# Patient Record
Sex: MALE | Race: WHITE | Employment: FULL TIME | URBAN - METROPOLITAN AREA
[De-identification: names, ages, dates, MRNs, and addresses within clinical notes are randomized per-mention and may not be internally consistent; named-entity substitution may affect disease eponyms.]

---

## 2018-03-15 RX ORDER — ATORVASTATIN CALCIUM 20 MG/1
20 TABLET, FILM COATED ORAL DAILY
COMMUNITY
End: 2018-03-16 | Stop reason: SDUPTHER

## 2018-03-15 RX ORDER — VARDENAFIL HYDROCHLORIDE 20 MG/1
20 TABLET ORAL PRN
COMMUNITY
End: 2018-05-11

## 2018-03-16 ENCOUNTER — OFFICE VISIT (OUTPATIENT)
Dept: PRIMARY CARE CLINIC | Age: 58
End: 2018-03-16
Payer: COMMERCIAL

## 2018-03-16 VITALS
SYSTOLIC BLOOD PRESSURE: 132 MMHG | HEART RATE: 82 BPM | DIASTOLIC BLOOD PRESSURE: 88 MMHG | BODY MASS INDEX: 29.47 KG/M2 | WEIGHT: 199 LBS | TEMPERATURE: 97.8 F | HEIGHT: 69 IN

## 2018-03-16 DIAGNOSIS — N52.8 OTHER MALE ERECTILE DYSFUNCTION: ICD-10-CM

## 2018-03-16 DIAGNOSIS — E78.2 MIXED HYPERLIPIDEMIA: ICD-10-CM

## 2018-03-16 DIAGNOSIS — J00 ACUTE NASOPHARYNGITIS: ICD-10-CM

## 2018-03-16 PROCEDURE — 99213 OFFICE O/P EST LOW 20 MIN: CPT | Performed by: FAMILY MEDICINE

## 2018-03-16 RX ORDER — ATORVASTATIN CALCIUM 40 MG/1
TABLET, FILM COATED ORAL
COMMUNITY
Start: 2018-01-26 | End: 2018-05-11

## 2018-03-16 RX ORDER — AMOXICILLIN 250 MG/1
250 CAPSULE ORAL 3 TIMES DAILY
Qty: 30 CAPSULE | Refills: 0 | Status: SHIPPED | OUTPATIENT
Start: 2018-03-16 | End: 2018-03-26

## 2018-03-16 ASSESSMENT — ENCOUNTER SYMPTOMS
BLOOD IN STOOL: 0
SHORTNESS OF BREATH: 0
SORE THROAT: 1
HEMOPTYSIS: 0
ORTHOPNEA: 0
ABDOMINAL PAIN: 0
BLURRED VISION: 0
NAUSEA: 0
COUGH: 1
EYE DISCHARGE: 0

## 2018-03-16 ASSESSMENT — PATIENT HEALTH QUESTIONNAIRE - PHQ9
1. LITTLE INTEREST OR PLEASURE IN DOING THINGS: 0
SUM OF ALL RESPONSES TO PHQ9 QUESTIONS 1 & 2: 0
SUM OF ALL RESPONSES TO PHQ QUESTIONS 1-9: 0
2. FEELING DOWN, DEPRESSED OR HOPELESS: 0

## 2018-03-16 NOTE — PROGRESS NOTES
OFFICE PROGRESS NOTE      SUBJECTIVE:        Patient ID:   Abdiel Cramer is a 59 y.o. male who presents for   Chief Complaint   Patient presents with    Hyperlipidemia    Sinus Problem    Pharyngitis           HPI:   Complaining of cough and sore throat for the last 3 days. . Patient states he is following the diet and exercise. . Lab results shows cholesterol is within normal limits. Triglycerides still borderline high        Prior to Admission medications    Medication Sig Start Date End Date Taking? Authorizing Provider   atorvastatin (LIPITOR) 40 MG tablet  1/26/18  Yes Historical Provider, MD   vardenafil (LEVITRA) 20 MG tablet Take 20 mg by mouth as needed for Erectile Dysfunction   Yes Historical Provider, MD   doxycycline monohydrate (MONODOX) 100 MG capsule Take 1 capsule by mouth 2 times daily 5/5/17   SENIA Pinto   predniSONE (DELTASONE) 20 MG tablet Two tablets qd 5/5/17   SENIA Pinto   brompheniramine-pseudoephedrine-DM 30-2-10 MG/5ML syrup Take 10 mLs by mouth 4 times daily as needed for Cough 5/5/17   SENIA Pinto     Social History     Social History    Marital status:      Spouse name: N/A    Number of children: N/A    Years of education: N/A     Social History Main Topics    Smoking status: Current Every Day Smoker    Smokeless tobacco: Never Used    Alcohol use 2.4 oz/week     4 Cans of beer per week    Drug use: No    Sexual activity: Not Asked     Other Topics Concern    None     Social History Narrative    None       I have reviewed Avinashmarcus's allergies, medications, problem list, medical, social and family history and have updated as needed in the electronic medical record  Review Of Systems:    Review of Systems   Constitutional: Negative for chills and fever. HENT: Positive for congestion and sore throat. Negative for ear pain. Eyes: Negative for blurred vision and discharge.

## 2018-04-06 ENCOUNTER — OFFICE VISIT (OUTPATIENT)
Dept: PRIMARY CARE CLINIC | Age: 58
End: 2018-04-06
Payer: COMMERCIAL

## 2018-04-06 VITALS
TEMPERATURE: 98.2 F | SYSTOLIC BLOOD PRESSURE: 140 MMHG | WEIGHT: 199 LBS | DIASTOLIC BLOOD PRESSURE: 90 MMHG | OXYGEN SATURATION: 97 % | BODY MASS INDEX: 29.47 KG/M2 | HEIGHT: 69 IN | HEART RATE: 88 BPM

## 2018-04-06 DIAGNOSIS — I10 ESSENTIAL HYPERTENSION: ICD-10-CM

## 2018-04-06 DIAGNOSIS — N52.9 ERECTILE DYSFUNCTION, UNSPECIFIED ERECTILE DYSFUNCTION TYPE: ICD-10-CM

## 2018-04-06 DIAGNOSIS — E78.2 MIXED HYPERLIPIDEMIA: Primary | ICD-10-CM

## 2018-04-06 PROCEDURE — 99213 OFFICE O/P EST LOW 20 MIN: CPT | Performed by: FAMILY MEDICINE

## 2018-04-06 RX ORDER — ATORVASTATIN CALCIUM 40 MG/1
40 TABLET, FILM COATED ORAL DAILY
Qty: 30 TABLET | Refills: 3 | Status: SHIPPED | OUTPATIENT
Start: 2018-04-06 | End: 2018-04-09 | Stop reason: SDUPTHER

## 2018-04-06 RX ORDER — VARDENAFIL HYDROCHLORIDE 20 MG/1
20 TABLET ORAL PRN
Qty: 30 TABLET | Refills: 3 | Status: SHIPPED | OUTPATIENT
Start: 2018-04-06 | End: 2018-04-09 | Stop reason: SDUPTHER

## 2018-04-06 ASSESSMENT — ENCOUNTER SYMPTOMS
ABDOMINAL PAIN: 0
NAUSEA: 0
HEMOPTYSIS: 0
ORTHOPNEA: 0
SHORTNESS OF BREATH: 0
BLOOD IN STOOL: 0
EYE DISCHARGE: 0
BLURRED VISION: 0

## 2018-04-09 DIAGNOSIS — N52.9 ERECTILE DYSFUNCTION, UNSPECIFIED ERECTILE DYSFUNCTION TYPE: ICD-10-CM

## 2018-04-09 DIAGNOSIS — E78.2 MIXED HYPERLIPIDEMIA: ICD-10-CM

## 2018-04-09 RX ORDER — ATORVASTATIN CALCIUM 40 MG/1
40 TABLET, FILM COATED ORAL DAILY
Qty: 30 TABLET | Refills: 3 | Status: SHIPPED | OUTPATIENT
Start: 2018-04-09 | End: 2018-08-10 | Stop reason: SDUPTHER

## 2018-04-09 RX ORDER — VARDENAFIL HYDROCHLORIDE 20 MG/1
20 TABLET ORAL PRN
Qty: 30 TABLET | Refills: 3 | Status: SHIPPED | OUTPATIENT
Start: 2018-04-09 | End: 2019-02-22 | Stop reason: SDUPTHER

## 2018-05-11 ENCOUNTER — OFFICE VISIT (OUTPATIENT)
Dept: PRIMARY CARE CLINIC | Age: 58
End: 2018-05-11
Payer: COMMERCIAL

## 2018-05-11 VITALS
SYSTOLIC BLOOD PRESSURE: 132 MMHG | HEIGHT: 69 IN | BODY MASS INDEX: 29.62 KG/M2 | DIASTOLIC BLOOD PRESSURE: 74 MMHG | WEIGHT: 200 LBS | HEART RATE: 90 BPM | OXYGEN SATURATION: 97 %

## 2018-05-11 DIAGNOSIS — E78.2 MIXED HYPERLIPIDEMIA: Primary | ICD-10-CM

## 2018-05-11 DIAGNOSIS — J40 BRONCHITIS: ICD-10-CM

## 2018-05-11 PROBLEM — J00 ACUTE NASOPHARYNGITIS: Status: RESOLVED | Noted: 2018-03-16 | Resolved: 2018-05-11

## 2018-05-11 PROCEDURE — 99214 OFFICE O/P EST MOD 30 MIN: CPT | Performed by: FAMILY MEDICINE

## 2018-05-11 ASSESSMENT — ENCOUNTER SYMPTOMS
BLOOD IN STOOL: 0
HEMOPTYSIS: 0
SHORTNESS OF BREATH: 0
COUGH: 1
BLURRED VISION: 0
ABDOMINAL PAIN: 0
ORTHOPNEA: 0
EYE DISCHARGE: 0
NAUSEA: 0

## 2018-07-11 DIAGNOSIS — F17.219 NICOTINE DEPENDENCE, CIGARETTES, WITH UNSPECIFIED NICOTINE-INDUCED DISORDERS: ICD-10-CM

## 2018-07-31 DIAGNOSIS — E78.2 MIXED HYPERLIPIDEMIA: ICD-10-CM

## 2018-07-31 DIAGNOSIS — J40 BRONCHITIS: ICD-10-CM

## 2018-07-31 LAB
ALBUMIN SERPL-MCNC: 4.2 G/DL
ALP BLD-CCNC: 91 U/L
ALT SERPL-CCNC: 20 U/L
ANION GAP SERPL CALCULATED.3IONS-SCNC: NORMAL MMOL/L
AST SERPL-CCNC: 21 U/L
BASOPHILS ABSOLUTE: 0.1 /ΜL
BASOPHILS RELATIVE PERCENT: 1 %
BILIRUB SERPL-MCNC: 0.4 MG/DL (ref 0.1–1.4)
BUN BLDV-MCNC: 16 MG/DL
CALCIUM SERPL-MCNC: 9.2 MG/DL
CHLORIDE BLD-SCNC: 107 MMOL/L
CHOLESTEROL, TOTAL: 186 MG/DL
CHOLESTEROL/HDL RATIO: NORMAL
CO2: 17 MMOL/L
CREAT SERPL-MCNC: 0.76 MG/DL
EOSINOPHILS ABSOLUTE: 0.4 /ΜL
EOSINOPHILS RELATIVE PERCENT: 4 %
GFR CALCULATED: NORMAL
GLUCOSE BLD-MCNC: 102 MG/DL
HCT VFR BLD CALC: 46.6 % (ref 41–53)
HDLC SERPL-MCNC: 42 MG/DL (ref 35–70)
HEMOGLOBIN: 15.7 G/DL (ref 13.5–17.5)
LDL CHOLESTEROL CALCULATED: 90 MG/DL (ref 0–160)
LYMPHOCYTES ABSOLUTE: 2.8 /ΜL
LYMPHOCYTES RELATIVE PERCENT: 32 %
MCH RBC QN AUTO: NORMAL PG
MCHC RBC AUTO-ENTMCNC: NORMAL G/DL
MCV RBC AUTO: NORMAL FL
MONOCYTES ABSOLUTE: 0.8 /ΜL
MONOCYTES RELATIVE PERCENT: 9 %
NEUTROPHILS ABSOLUTE: 4.7 /ΜL
NEUTROPHILS RELATIVE PERCENT: 54 %
PLATELET # BLD: 190 K/ΜL
PMV BLD AUTO: NORMAL FL
POTASSIUM SERPL-SCNC: 4 MMOL/L
RBC # BLD: 5.09 10^6/ΜL
SODIUM BLD-SCNC: 142 MMOL/L
TOTAL PROTEIN: 6.6
TRIGL SERPL-MCNC: 270 MG/DL
VLDLC SERPL CALC-MCNC: 54 MG/DL
WBC # BLD: 8.8 10^3/ML

## 2018-08-10 ENCOUNTER — OFFICE VISIT (OUTPATIENT)
Dept: PRIMARY CARE CLINIC | Age: 58
End: 2018-08-10
Payer: COMMERCIAL

## 2018-08-10 VITALS
BODY MASS INDEX: 29.77 KG/M2 | SYSTOLIC BLOOD PRESSURE: 138 MMHG | WEIGHT: 201 LBS | HEIGHT: 69 IN | DIASTOLIC BLOOD PRESSURE: 72 MMHG | TEMPERATURE: 98.4 F | HEART RATE: 74 BPM | OXYGEN SATURATION: 98 %

## 2018-08-10 DIAGNOSIS — E78.2 MIXED HYPERLIPIDEMIA: ICD-10-CM

## 2018-08-10 DIAGNOSIS — F17.219 NICOTINE DEPENDENCE, CIGARETTES, WITH UNSPECIFIED NICOTINE-INDUCED DISORDERS: ICD-10-CM

## 2018-08-10 DIAGNOSIS — J40 BRONCHITIS: Primary | ICD-10-CM

## 2018-08-10 PROCEDURE — 3017F COLORECTAL CA SCREEN DOC REV: CPT | Performed by: FAMILY MEDICINE

## 2018-08-10 PROCEDURE — 99213 OFFICE O/P EST LOW 20 MIN: CPT | Performed by: FAMILY MEDICINE

## 2018-08-10 PROCEDURE — 4004F PT TOBACCO SCREEN RCVD TLK: CPT | Performed by: FAMILY MEDICINE

## 2018-08-10 PROCEDURE — G8419 CALC BMI OUT NRM PARAM NOF/U: HCPCS | Performed by: FAMILY MEDICINE

## 2018-08-10 PROCEDURE — G8427 DOCREV CUR MEDS BY ELIG CLIN: HCPCS | Performed by: FAMILY MEDICINE

## 2018-08-10 RX ORDER — ATORVASTATIN CALCIUM 40 MG/1
40 TABLET, FILM COATED ORAL DAILY
Qty: 90 TABLET | Refills: 0 | Status: SHIPPED | OUTPATIENT
Start: 2018-08-10 | End: 2019-02-22 | Stop reason: SDUPTHER

## 2018-08-10 RX ORDER — DEXTROMETHORPHAN HYDROBROMIDE AND PROMETHAZINE HYDROCHLORIDE 15; 6.25 MG/5ML; MG/5ML
5 SYRUP ORAL 4 TIMES DAILY PRN
Qty: 1 BOTTLE | Refills: 0 | Status: SHIPPED | OUTPATIENT
Start: 2018-08-10 | End: 2018-08-17

## 2018-08-10 RX ORDER — AZITHROMYCIN 250 MG/1
TABLET, FILM COATED ORAL
Qty: 1 PACKET | Refills: 0 | Status: SHIPPED | OUTPATIENT
Start: 2018-08-10 | End: 2018-08-14

## 2018-08-10 ASSESSMENT — ENCOUNTER SYMPTOMS
HEMOPTYSIS: 0
EYE DISCHARGE: 0
BLURRED VISION: 0
ABDOMINAL PAIN: 0
BLOOD IN STOOL: 0
NAUSEA: 0
SHORTNESS OF BREATH: 0
ORTHOPNEA: 0
COUGH: 1

## 2018-08-10 NOTE — PATIENT INSTRUCTIONS
Stop smoking  Attending the smoke cessation program  Take the Z-Quinton  Hold the Lipitor at the time of antibiotics  Continue low-sodium low-fat diet

## 2018-08-10 NOTE — LETTER
[address] [address]  Phone: [phone]  Fax: David Blackmon MD        August 10, 2018     Patient: Adonay Serna   YOB: 1960   Date of Visit: 8/10/2018       To Whom it May Concern:    Stewart Forbes was seen in my clinic on 8/10/2018. He may return to work on 08/13/18. If you have any questions or concerns, please don't hesitate to call.     Sincerely,         Charles Reddy MD

## 2018-08-10 NOTE — PROGRESS NOTES
OFFICE PROGRESS NOTE      SUBJECTIVE:        Patient ID:   Ted Perrin is a 59 y.o. male who presents for   Chief Complaint   Patient presents with    Hyperlipidemia    Discuss Labs    Congestion     chest congestion concerned he has dust in his lungs           HPI:     Patient complaining of cough for a few days  Still continues to smoke  Lab work shows elevated triglycerides  Patient is not following the diet  Also complaining of ED        Prior to Admission medications    Medication Sig Start Date End Date Taking?  Authorizing Provider   atorvastatin (LIPITOR) 40 MG tablet Take 1 tablet by mouth daily 8/10/18  Yes Ramón Anderson MD   PROAIR  (77 Base) MCG/ACT inhaler Inhale 2 puffs into the lungs 4 times daily 8/10/18  Yes Ramón Anderson MD   azithromycin (ZITHROMAX Z-IBIS) 250 MG tablet Take 2 tablets (500 mg) on Day 1, and then take 1 tablet (250 mg) on days 2 through 5. 8/10/18 8/14/18 Yes Ramón Anderson MD   promethazine-dextromethorphan (PROMETHAZINE-DM) 6.25-15 MG/5ML syrup Take 5 mLs by mouth 4 times daily as needed for Cough 8/10/18 8/17/18 Yes Ramón Anderson MD   vardenafil (LEVITRA) 20 MG tablet Take 1 tablet by mouth as needed for Erectile Dysfunction 4/9/18  Yes Ramón Anderson MD     Social History     Social History    Marital status:      Spouse name: N/A    Number of children: N/A    Years of education: N/A     Social History Main Topics    Smoking status: Current Every Day Smoker     Packs/day: 1.00     Years: 30.00     Start date: 1/1/1970    Smokeless tobacco: Never Used    Alcohol use 2.4 oz/week     4 Cans of beer per week    Drug use: No    Sexual activity: Not on file     Other Topics Concern    Not on file     Social History Narrative    No narrative on file       I have reviewed Clifton's allergies, medications, problem list, medical, social and family history and have updated as needed in the electronic

## 2018-08-31 ENCOUNTER — OFFICE VISIT (OUTPATIENT)
Dept: PRIMARY CARE CLINIC | Age: 58
End: 2018-08-31
Payer: COMMERCIAL

## 2018-08-31 VITALS
HEART RATE: 86 BPM | HEIGHT: 69 IN | WEIGHT: 205 LBS | DIASTOLIC BLOOD PRESSURE: 88 MMHG | SYSTOLIC BLOOD PRESSURE: 132 MMHG | OXYGEN SATURATION: 97 % | BODY MASS INDEX: 30.36 KG/M2

## 2018-08-31 DIAGNOSIS — I10 ESSENTIAL HYPERTENSION: ICD-10-CM

## 2018-08-31 DIAGNOSIS — N52.9 ERECTILE DYSFUNCTION, UNSPECIFIED ERECTILE DYSFUNCTION TYPE: ICD-10-CM

## 2018-08-31 DIAGNOSIS — J40 BRONCHITIS: ICD-10-CM

## 2018-08-31 DIAGNOSIS — F17.219 NICOTINE DEPENDENCE, CIGARETTES, WITH UNSPECIFIED NICOTINE-INDUCED DISORDERS: ICD-10-CM

## 2018-08-31 DIAGNOSIS — E78.2 MIXED HYPERLIPIDEMIA: Primary | ICD-10-CM

## 2018-08-31 PROCEDURE — 3017F COLORECTAL CA SCREEN DOC REV: CPT | Performed by: FAMILY MEDICINE

## 2018-08-31 PROCEDURE — G8427 DOCREV CUR MEDS BY ELIG CLIN: HCPCS | Performed by: FAMILY MEDICINE

## 2018-08-31 PROCEDURE — 99214 OFFICE O/P EST MOD 30 MIN: CPT | Performed by: FAMILY MEDICINE

## 2018-08-31 PROCEDURE — G8417 CALC BMI ABV UP PARAM F/U: HCPCS | Performed by: FAMILY MEDICINE

## 2018-08-31 PROCEDURE — 4004F PT TOBACCO SCREEN RCVD TLK: CPT | Performed by: FAMILY MEDICINE

## 2018-08-31 RX ORDER — TRIAMCINOLONE ACETONIDE 1 MG/G
CREAM TOPICAL 2 TIMES DAILY
COMMUNITY
End: 2018-08-31 | Stop reason: SDUPTHER

## 2018-08-31 RX ORDER — TRIAMCINOLONE ACETONIDE 1 MG/G
CREAM TOPICAL 2 TIMES DAILY
Qty: 1 TUBE | Refills: 1 | Status: SHIPPED | OUTPATIENT
Start: 2018-08-31 | End: 2019-07-12

## 2018-08-31 ASSESSMENT — ENCOUNTER SYMPTOMS
BLOOD IN STOOL: 0
SHORTNESS OF BREATH: 0
ABDOMINAL PAIN: 0
HEMOPTYSIS: 0
BLURRED VISION: 0
ORTHOPNEA: 0
NAUSEA: 0
EYE DISCHARGE: 0

## 2018-08-31 NOTE — PATIENT INSTRUCTIONS
Continue low-sodium low-fat diet  Exercises  Follow with the smoke cessation program  Repeat the lab work in 3 months  Within the clinic earlier if any problems  Use the Kenalog cream when necessary if required for the poison ivy

## 2018-08-31 NOTE — PROGRESS NOTES
discharge. Respiratory: Negative for hemoptysis and shortness of breath (No new SOB). Cardiovascular: Negative for chest pain, orthopnea and leg swelling. Gastrointestinal: Negative for abdominal pain, blood in stool and nausea. Genitourinary: Negative for flank pain and hematuria. Musculoskeletal: Negative for myalgias and neck pain. Skin: Negative for rash. Neurological: Negative for dizziness, focal weakness and seizures. Endo/Heme/Allergies: Negative for polydipsia. Does not bruise/bleed easily. Psychiatric/Behavioral: Negative for depression, hallucinations and suicidal ideas. OBJECTIVE:     VS:  Wt Readings from Last 3 Encounters:   08/31/18 205 lb (93 kg)   08/10/18 201 lb (91.2 kg)   05/11/18 200 lb (90.7 kg)     Temp Readings from Last 3 Encounters:   08/10/18 98.4 °F (36.9 °C) (Oral)   04/06/18 98.2 °F (36.8 °C) (Oral)   03/16/18 97.8 °F (36.6 °C) (Oral)     BP Readings from Last 3 Encounters:   08/31/18 132/88   08/10/18 138/72   05/11/18 132/74        Physical Exam   Constitutional: He is oriented to person, place, and time. He appears well-developed and well-nourished. HENT:   Head: Normocephalic and atraumatic. Mouth/Throat: Oropharynx is clear and moist.   Eyes: Pupils are equal, round, and reactive to light. Conjunctivae are normal.   Neck: Normal range of motion. Neck supple. Cardiovascular: Normal rate, regular rhythm, normal heart sounds and intact distal pulses. Pulmonary/Chest: Effort normal and breath sounds normal.   Abdominal: Soft. Bowel sounds are normal.   Musculoskeletal: Normal range of motion. Neurological: He is alert and oriented to person, place, and time. Skin: Skin is warm and dry.    Psychiatric: His behavior is normal.          Labs :    Lab Results   Component Value Date    WBC 8.8 07/30/2018    HGB 15.7 07/30/2018    HCT 46.6 07/30/2018     07/30/2018    CHOL 186 07/30/2018    TRIG 270 07/30/2018    HDL 42 07/30/2018    ALT 20 07/30/2018    AST 21 07/30/2018     07/30/2018    K 4.0 07/30/2018     07/30/2018    CREATININE 0.76 07/30/2018    BUN 16 07/30/2018    CO2 17 07/30/2018     No results found for: VOL, APPEARANCE, COLORU, LABSPEC, LABPH, LEUKBLD, NITRU, GLUCOSEU, KETUA, UROBILINOGEN, KETUA, UROBILINOGEN, BILIRUBINUR, OCBU  No results found for: PSA, CEA, , PO7023,       Controlled Substances Monitoring:                                    ASSESSMENT     Patient Active Problem List    Diagnosis Date Noted    Nicotine dependence, cigarettes, with unspecified nicotine-induced disorders     Bronchitis 05/11/2018    Mixed hyperlipidemia 03/16/2018    Other male erectile dysfunction 03/16/2018        Diagnosis:     ICD-10-CM ICD-9-CM    1. Mixed hyperlipidemia E78.2 272.2 CBC      Comprehensive Metabolic Panel      Lipid Panel   2. Bronchitis J40 490    3. Essential hypertension I10 401.9 CBC      Comprehensive Metabolic Panel   4. Nicotine dependence, cigarettes, with unspecified nicotine-induced disorders F17.219 292.9    5. Erectile dysfunction, unspecified erectile dysfunction type N52.9 607.84        PLAN:   Continue present treatment  Low-sodium low-fat diet  Regular exercises  Continue following with the smoke cessation program  Repeat the lab work in 3 months  Kenalog cream given to be used on his contact dermatitis  Patient to return to the clinic earlier if any problem        Patient Instructions   Continue low-sodium low-fat diet  Exercises  Follow with the smoke cessation program  Repeat the lab work in 3 months  Within the clinic earlier if any problems  Use the Kenalog cream when necessary if required for the poison ivy      Return in about 3 months (around 11/30/2018). I have reviewed my findings and recommendations with Robinson Hsu.     Electronically signed by Gregory Bursh MD on 8/31/18 at 1:54 PM

## 2018-11-20 DIAGNOSIS — Z12.11 SCREENING FOR COLON CANCER: Primary | ICD-10-CM

## 2018-11-24 LAB
ALBUMIN SERPL-MCNC: 4.9 G/DL
ALP BLD-CCNC: 90 U/L
ALT SERPL-CCNC: 21 U/L
ANION GAP SERPL CALCULATED.3IONS-SCNC: NORMAL MMOL/L
AST SERPL-CCNC: 21 U/L
BASOPHILS ABSOLUTE: 0.1 /ΜL
BASOPHILS RELATIVE PERCENT: 0 %
BILIRUB SERPL-MCNC: 0.5 MG/DL (ref 0.1–1.4)
BUN BLDV-MCNC: 18 MG/DL
CALCIUM SERPL-MCNC: 9.7 MG/DL
CHLORIDE BLD-SCNC: 106 MMOL/L
CHOLESTEROL, TOTAL: 195 MG/DL
CHOLESTEROL/HDL RATIO: NORMAL
CO2: 17 MMOL/L
CREAT SERPL-MCNC: 0.96 MG/DL
EOSINOPHILS ABSOLUTE: 0.3 /ΜL
EOSINOPHILS RELATIVE PERCENT: 3 %
GFR CALCULATED: 86
GLUCOSE BLD-MCNC: 109 MG/DL
HCT VFR BLD CALC: 51.4 % (ref 41–53)
HDLC SERPL-MCNC: 46 MG/DL (ref 35–70)
HEMOGLOBIN: 16.7 G/DL (ref 13.5–17.5)
LDL CHOLESTEROL CALCULATED: 108 MG/DL (ref 0–160)
LYMPHOCYTES ABSOLUTE: 3.2 /ΜL
LYMPHOCYTES RELATIVE PERCENT: 26 %
MCH RBC QN AUTO: 31.2 PG
MCHC RBC AUTO-ENTMCNC: 32.5 G/DL
MCV RBC AUTO: 96 FL
MONOCYTES ABSOLUTE: 0.7 /ΜL
MONOCYTES RELATIVE PERCENT: 6 %
NEUTROPHILS ABSOLUTE: 7.8 /ΜL
NEUTROPHILS RELATIVE PERCENT: 65 %
PLATELET # BLD: 235 K/ΜL
PMV BLD AUTO: NORMAL FL
POTASSIUM SERPL-SCNC: 4.3 MMOL/L
RBC # BLD: 5.36 10^6/ΜL
SODIUM BLD-SCNC: 142 MMOL/L
TOTAL PROTEIN: 6.9
TRIGL SERPL-MCNC: 207 MG/DL
VLDLC SERPL CALC-MCNC: 41 MG/DL
WBC # BLD: 12.1 10^3/ML

## 2018-11-30 ENCOUNTER — OFFICE VISIT (OUTPATIENT)
Dept: PRIMARY CARE CLINIC | Age: 58
End: 2018-11-30
Payer: COMMERCIAL

## 2018-11-30 VITALS
HEIGHT: 69 IN | HEART RATE: 85 BPM | OXYGEN SATURATION: 96 % | BODY MASS INDEX: 30.21 KG/M2 | WEIGHT: 204 LBS | SYSTOLIC BLOOD PRESSURE: 146 MMHG | DIASTOLIC BLOOD PRESSURE: 86 MMHG

## 2018-11-30 DIAGNOSIS — N52.9 ERECTILE DYSFUNCTION, UNSPECIFIED ERECTILE DYSFUNCTION TYPE: ICD-10-CM

## 2018-11-30 DIAGNOSIS — J01.01 ACUTE RECURRENT MAXILLARY SINUSITIS: ICD-10-CM

## 2018-11-30 DIAGNOSIS — F17.219 NICOTINE DEPENDENCE, CIGARETTES, WITH UNSPECIFIED NICOTINE-INDUCED DISORDERS: ICD-10-CM

## 2018-11-30 DIAGNOSIS — I10 ESSENTIAL HYPERTENSION: ICD-10-CM

## 2018-11-30 DIAGNOSIS — E78.2 MIXED HYPERLIPIDEMIA: Primary | ICD-10-CM

## 2018-11-30 DIAGNOSIS — H66.92 LEFT OTITIS MEDIA, UNSPECIFIED OTITIS MEDIA TYPE: ICD-10-CM

## 2018-11-30 PROCEDURE — G8427 DOCREV CUR MEDS BY ELIG CLIN: HCPCS | Performed by: FAMILY MEDICINE

## 2018-11-30 PROCEDURE — G8417 CALC BMI ABV UP PARAM F/U: HCPCS | Performed by: FAMILY MEDICINE

## 2018-11-30 PROCEDURE — 4004F PT TOBACCO SCREEN RCVD TLK: CPT | Performed by: FAMILY MEDICINE

## 2018-11-30 PROCEDURE — G8484 FLU IMMUNIZE NO ADMIN: HCPCS | Performed by: FAMILY MEDICINE

## 2018-11-30 PROCEDURE — 3017F COLORECTAL CA SCREEN DOC REV: CPT | Performed by: FAMILY MEDICINE

## 2018-11-30 PROCEDURE — 99214 OFFICE O/P EST MOD 30 MIN: CPT | Performed by: FAMILY MEDICINE

## 2018-11-30 RX ORDER — GUAIFENESIN 600 MG/1
600 TABLET, EXTENDED RELEASE ORAL 2 TIMES DAILY
Qty: 30 TABLET | Refills: 0 | Status: SHIPPED | OUTPATIENT
Start: 2018-11-30 | End: 2018-12-07 | Stop reason: ALTCHOICE

## 2018-11-30 RX ORDER — HYDROCHLOROTHIAZIDE 25 MG/1
25 TABLET ORAL EVERY MORNING
Qty: 90 TABLET | Refills: 1 | Status: SHIPPED
Start: 2018-11-30 | End: 2020-02-28

## 2018-11-30 RX ORDER — AMOXICILLIN 250 MG/1
250 CAPSULE ORAL 3 TIMES DAILY
Qty: 30 CAPSULE | Refills: 0 | Status: SHIPPED | OUTPATIENT
Start: 2018-11-30 | End: 2018-12-07 | Stop reason: ALTCHOICE

## 2018-11-30 ASSESSMENT — ENCOUNTER SYMPTOMS
EYES NEGATIVE: 1
GASTROINTESTINAL NEGATIVE: 1
ALLERGIC/IMMUNOLOGIC NEGATIVE: 1
RESPIRATORY NEGATIVE: 1

## 2018-11-30 NOTE — LETTER
[address] [address]  Phone: [phone]  Fax: Yong Grubbs MD        November 30, 2018     Patient: Dianne Red   YOB: 1960   Date of Visit: 11/30/2018       To Whom it May Concern:    Wilner Smith was seen in my clinic on 11/30/2018. He may return to work on 12/03/18. If you have any questions or concerns, please don't hesitate to call.     Sincerely,         Melissa Harrison MD

## 2018-11-30 NOTE — PROGRESS NOTES
07/30/2018    TRIG 270 07/30/2018    HDL 42 07/30/2018    ALT 20 07/30/2018    AST 21 07/30/2018     07/30/2018    K 4.0 07/30/2018     07/30/2018    CREATININE 0.76 07/30/2018    BUN 16 07/30/2018    CO2 17 07/30/2018     No results found for: VOL, APPEARANCE, COLORU, LABSPEC, LABPH, LEUKBLD, NITRU, GLUCOSEU, KETUA, UROBILINOGEN, KETUA, UROBILINOGEN, BILIRUBINUR, OCBU  No results found for: PSA, CEA, , WP6853,       Controlled Substances Monitoring:                                    ASSESSMENT     Patient Active Problem List    Diagnosis Date Noted    Nicotine dependence, cigarettes, with unspecified nicotine-induced disorders     Bronchitis 05/11/2018    Mixed hyperlipidemia 03/16/2018    Other male erectile dysfunction 03/16/2018        Diagnosis:     ICD-10-CM    1. Mixed hyperlipidemia E78.2    2. Nicotine dependence, cigarettes, with unspecified nicotine-induced disorders F17.219    3. Erectile dysfunction, unspecified erectile dysfunction type N52.9    4. Essential hypertension I10    5. Acute recurrent maxillary sinusitis J01.01    6. Left otitis media, unspecified otitis media type H66.92        PLAN:   Low-sodium low-fat  Amoxil 250 mg 3 times a day for 7 days  Mucinex 1 twice a day  Quit smoking program  Hydrochlorothiazide 25 mg daily  Return to clinic earlier if any problems  Serial blood pressure readings        Patient Instructions   Continue present treatment  Low-sodium low-fat diet  Quit smoking program  Take the antibiotics as prescribed  Return to clinic earlier if any problems      Return in about 1 week (around 12/7/2018). Matthias Laughlin reviewed my findings and recommendations with Hussein Maddox.     Electronicallysigned by Deshawn Ngo MD on 11/30/18 at 2:08 PM

## 2018-12-07 ENCOUNTER — OFFICE VISIT (OUTPATIENT)
Dept: PRIMARY CARE CLINIC | Age: 58
End: 2018-12-07
Payer: COMMERCIAL

## 2018-12-07 VITALS
TEMPERATURE: 98.9 F | HEART RATE: 81 BPM | DIASTOLIC BLOOD PRESSURE: 84 MMHG | SYSTOLIC BLOOD PRESSURE: 138 MMHG | WEIGHT: 201 LBS | HEIGHT: 69 IN | BODY MASS INDEX: 29.77 KG/M2 | OXYGEN SATURATION: 98 %

## 2018-12-07 DIAGNOSIS — Z23 NEEDS FLU SHOT: ICD-10-CM

## 2018-12-07 DIAGNOSIS — F17.219 NICOTINE DEPENDENCE, CIGARETTES, WITH UNSPECIFIED NICOTINE-INDUCED DISORDERS: ICD-10-CM

## 2018-12-07 DIAGNOSIS — E78.2 MIXED HYPERLIPIDEMIA: ICD-10-CM

## 2018-12-07 DIAGNOSIS — N52.9 ERECTILE DYSFUNCTION, UNSPECIFIED ERECTILE DYSFUNCTION TYPE: ICD-10-CM

## 2018-12-07 DIAGNOSIS — Z23 NEED FOR 23-POLYVALENT PNEUMOCOCCAL POLYSACCHARIDE VACCINE: Primary | ICD-10-CM

## 2018-12-07 DIAGNOSIS — I10 ESSENTIAL HYPERTENSION: ICD-10-CM

## 2018-12-07 PROCEDURE — G8427 DOCREV CUR MEDS BY ELIG CLIN: HCPCS | Performed by: FAMILY MEDICINE

## 2018-12-07 PROCEDURE — 4004F PT TOBACCO SCREEN RCVD TLK: CPT | Performed by: FAMILY MEDICINE

## 2018-12-07 PROCEDURE — 99213 OFFICE O/P EST LOW 20 MIN: CPT | Performed by: FAMILY MEDICINE

## 2018-12-07 PROCEDURE — 90682 RIV4 VACC RECOMBINANT DNA IM: CPT | Performed by: FAMILY MEDICINE

## 2018-12-07 PROCEDURE — 90471 IMMUNIZATION ADMIN: CPT | Performed by: FAMILY MEDICINE

## 2018-12-07 PROCEDURE — 90472 IMMUNIZATION ADMIN EACH ADD: CPT | Performed by: FAMILY MEDICINE

## 2018-12-07 PROCEDURE — 3017F COLORECTAL CA SCREEN DOC REV: CPT | Performed by: FAMILY MEDICINE

## 2018-12-07 PROCEDURE — G8482 FLU IMMUNIZE ORDER/ADMIN: HCPCS | Performed by: FAMILY MEDICINE

## 2018-12-07 PROCEDURE — G8417 CALC BMI ABV UP PARAM F/U: HCPCS | Performed by: FAMILY MEDICINE

## 2018-12-07 PROCEDURE — 90732 PPSV23 VACC 2 YRS+ SUBQ/IM: CPT | Performed by: FAMILY MEDICINE

## 2018-12-07 ASSESSMENT — PATIENT HEALTH QUESTIONNAIRE - PHQ9
1. LITTLE INTEREST OR PLEASURE IN DOING THINGS: 0
SUM OF ALL RESPONSES TO PHQ QUESTIONS 1-9: 0
SUM OF ALL RESPONSES TO PHQ9 QUESTIONS 1 & 2: 0
SUM OF ALL RESPONSES TO PHQ QUESTIONS 1-9: 0
2. FEELING DOWN, DEPRESSED OR HOPELESS: 0

## 2018-12-07 ASSESSMENT — ENCOUNTER SYMPTOMS
ALLERGIC/IMMUNOLOGIC NEGATIVE: 1
EYES NEGATIVE: 1
GASTROINTESTINAL NEGATIVE: 1
RESPIRATORY NEGATIVE: 1

## 2018-12-07 NOTE — PROGRESS NOTES
found for: PSA, CEA, , OM5713,       Controlled Substances Monitoring:                                    ASSESSMENT     Patient Active Problem List    Diagnosis Date Noted    Nicotine dependence, cigarettes, with unspecified nicotine-induced disorders     Bronchitis 05/11/2018    Mixed hyperlipidemia 03/16/2018    Other male erectile dysfunction 03/16/2018        Diagnosis:     ICD-10-CM    1. Need for 23-polyvalent pneumococcal polysaccharide vaccine Z23 PNEUMOVAX 23 subcutaneous/IM (Pneumococcal polysaccharide vaccine 23-valent >= 3yo)   2. Needs flu shot Z23 INFLUENZA, QUADV, RECOMBINANT, 18 YRS AND OLDER, IM, PF, PREFILL SYR OR SDV, 0.5ML (FLUBLOK QUADV, PF)   3. Mixed hyperlipidemia E78.2 CBC     COMPREHENSIVE METABOLIC PANEL     LIPID PANEL   4. Nicotine dependence, cigarettes, with unspecified nicotine-induced disorders F17.219    5. Erectile dysfunction, unspecified erectile dysfunction type N52.9    6. Essential hypertension I10 COMPREHENSIVE METABOLIC PANEL       PLAN:      continue taking medication as prescribed   Low-sodium low-fat diet  Regular exercises  Quit smoking program  Return to clinic earlier if any problems         Patient Instructions   Continue low-sodium low-fat diet  Quit smoking program  Regular exercises  Take the medication as prescribed  Return to clinic earlier if any problems      Return in about 4 weeks (around 1/4/2019). Macrina Bolton reviewed my findings and recommendations with Selene Mathur.     Electronicallysigned by Ross Vargas MD on 12/7/18 at 2:06 PM

## 2018-12-07 NOTE — LETTER
[address] [address]  Phone: [phone]  Fax: Dana Acosta MD        December 7, 2018     Patient: Libby Bahena   YOB: 1960   Date of Visit: 12/7/2018       To Whom it May Concern:    Carmel Benitez was seen in my clinic on 12/7/2018. He may return to work on 12/08/18. If you have any questions or concerns, please don't hesitate to call.     Sincerely,         Kathi Cuenca MD

## 2019-02-18 LAB
ALBUMIN SERPL-MCNC: 4.6 G/DL
ALP BLD-CCNC: 94 U/L
ALT SERPL-CCNC: 23 U/L
ANION GAP SERPL CALCULATED.3IONS-SCNC: NORMAL MMOL/L
AST SERPL-CCNC: 19 U/L
BASOPHILS ABSOLUTE: 0.1 /ΜL
BASOPHILS RELATIVE PERCENT: 1 %
BILIRUB SERPL-MCNC: 0.4 MG/DL (ref 0.1–1.4)
BUN BLDV-MCNC: 21 MG/DL
CALCIUM SERPL-MCNC: 9.5 MG/DL
CHLORIDE BLD-SCNC: 104 MMOL/L
CHOLESTEROL, TOTAL: 200 MG/DL
CHOLESTEROL/HDL RATIO: NORMAL
CO2: 17 MMOL/L
CREAT SERPL-MCNC: 0.84 MG/DL
EOSINOPHILS ABSOLUTE: 0.3 /ΜL
EOSINOPHILS RELATIVE PERCENT: 3 %
GFR CALCULATED: NORMAL
GLUCOSE BLD-MCNC: 91 MG/DL
HCT VFR BLD CALC: 43.6 % (ref 41–53)
HDLC SERPL-MCNC: 55 MG/DL (ref 35–70)
HEMOGLOBIN: 14.9 G/DL (ref 13.5–17.5)
LDL CHOLESTEROL CALCULATED: 113 MG/DL (ref 0–160)
LYMPHOCYTES ABSOLUTE: 2.3 /ΜL
LYMPHOCYTES RELATIVE PERCENT: 25 %
MCH RBC QN AUTO: 31.2 PG
MCHC RBC AUTO-ENTMCNC: 34.2 G/DL
MCV RBC AUTO: 91 FL
MONOCYTES ABSOLUTE: 0.7 /ΜL
MONOCYTES RELATIVE PERCENT: 7 %
NEUTROPHILS ABSOLUTE: 6 /ΜL
NEUTROPHILS RELATIVE PERCENT: 64 %
PLATELET # BLD: 325 K/ΜL
PMV BLD AUTO: NORMAL FL
POTASSIUM SERPL-SCNC: 4.4 MMOL/L
RBC # BLD: 4.78 10^6/ΜL
SODIUM BLD-SCNC: 143 MMOL/L
TOTAL PROTEIN: 6.9
TRIGL SERPL-MCNC: 162 MG/DL
VLDLC SERPL CALC-MCNC: 32 MG/DL
WBC # BLD: 9.3 10^3/ML

## 2019-02-19 DIAGNOSIS — E78.2 MIXED HYPERLIPIDEMIA: ICD-10-CM

## 2019-02-19 DIAGNOSIS — I10 ESSENTIAL HYPERTENSION: ICD-10-CM

## 2019-02-22 ENCOUNTER — OFFICE VISIT (OUTPATIENT)
Dept: FAMILY MEDICINE CLINIC | Age: 59
End: 2019-02-22
Payer: COMMERCIAL

## 2019-02-22 VITALS
HEIGHT: 69 IN | BODY MASS INDEX: 29.92 KG/M2 | WEIGHT: 202 LBS | OXYGEN SATURATION: 98 % | HEART RATE: 83 BPM | DIASTOLIC BLOOD PRESSURE: 80 MMHG | SYSTOLIC BLOOD PRESSURE: 122 MMHG

## 2019-02-22 DIAGNOSIS — I10 ESSENTIAL HYPERTENSION: Primary | ICD-10-CM

## 2019-02-22 DIAGNOSIS — F17.210 CIGARETTE NICOTINE DEPENDENCE WITHOUT COMPLICATION: ICD-10-CM

## 2019-02-22 DIAGNOSIS — N52.9 ERECTILE DYSFUNCTION, UNSPECIFIED ERECTILE DYSFUNCTION TYPE: ICD-10-CM

## 2019-02-22 DIAGNOSIS — E78.2 MIXED HYPERLIPIDEMIA: ICD-10-CM

## 2019-02-22 PROCEDURE — G8482 FLU IMMUNIZE ORDER/ADMIN: HCPCS | Performed by: FAMILY MEDICINE

## 2019-02-22 PROCEDURE — 4004F PT TOBACCO SCREEN RCVD TLK: CPT | Performed by: FAMILY MEDICINE

## 2019-02-22 PROCEDURE — G8417 CALC BMI ABV UP PARAM F/U: HCPCS | Performed by: FAMILY MEDICINE

## 2019-02-22 PROCEDURE — 99214 OFFICE O/P EST MOD 30 MIN: CPT | Performed by: FAMILY MEDICINE

## 2019-02-22 PROCEDURE — G8427 DOCREV CUR MEDS BY ELIG CLIN: HCPCS | Performed by: FAMILY MEDICINE

## 2019-02-22 PROCEDURE — 3017F COLORECTAL CA SCREEN DOC REV: CPT | Performed by: FAMILY MEDICINE

## 2019-02-22 RX ORDER — AMOXICILLIN AND CLAVULANATE POTASSIUM 875; 125 MG/1; MG/1
TABLET, FILM COATED ORAL
Refills: 0 | COMMUNITY
Start: 2019-02-15 | End: 2019-05-24 | Stop reason: ALTCHOICE

## 2019-02-22 RX ORDER — VARDENAFIL HYDROCHLORIDE 20 MG/1
20 TABLET ORAL PRN
Qty: 30 TABLET | Refills: 3 | Status: SHIPPED | OUTPATIENT
Start: 2019-02-22 | End: 2019-05-24 | Stop reason: SDUPTHER

## 2019-02-22 RX ORDER — ATORVASTATIN CALCIUM 40 MG/1
40 TABLET, FILM COATED ORAL DAILY
Qty: 90 TABLET | Refills: 0 | Status: SHIPPED | OUTPATIENT
Start: 2019-02-22 | End: 2019-07-12

## 2019-02-22 ASSESSMENT — PATIENT HEALTH QUESTIONNAIRE - PHQ9
1. LITTLE INTEREST OR PLEASURE IN DOING THINGS: 0
SUM OF ALL RESPONSES TO PHQ QUESTIONS 1-9: 0
2. FEELING DOWN, DEPRESSED OR HOPELESS: 0
SUM OF ALL RESPONSES TO PHQ9 QUESTIONS 1 & 2: 0
SUM OF ALL RESPONSES TO PHQ QUESTIONS 1-9: 0

## 2019-02-22 ASSESSMENT — ENCOUNTER SYMPTOMS
GASTROINTESTINAL NEGATIVE: 1
EYES NEGATIVE: 1
RESPIRATORY NEGATIVE: 1
ALLERGIC/IMMUNOLOGIC NEGATIVE: 1

## 2019-05-05 LAB
ALBUMIN SERPL-MCNC: 4.6 G/DL
ALP BLD-CCNC: 94 U/L
ALT SERPL-CCNC: 23 U/L
ANION GAP SERPL CALCULATED.3IONS-SCNC: NORMAL MMOL/L
AST SERPL-CCNC: 18 U/L
BILIRUB SERPL-MCNC: 0.5 MG/DL (ref 0.1–1.4)
BUN BLDV-MCNC: 15 MG/DL
CALCIUM SERPL-MCNC: 9.7 MG/DL
CHLORIDE BLD-SCNC: 105 MMOL/L
CHOLESTEROL, TOTAL: 207 MG/DL
CHOLESTEROL/HDL RATIO: NORMAL
CO2: 20 MMOL/L
CREAT SERPL-MCNC: 0.75 MG/DL
GFR CALCULATED: NORMAL
GLUCOSE BLD-MCNC: 103 MG/DL
HDLC SERPL-MCNC: 42 MG/DL (ref 35–70)
LDL CHOLESTEROL CALCULATED: 93 MG/DL (ref 0–160)
POTASSIUM SERPL-SCNC: 4.3 MMOL/L
SODIUM BLD-SCNC: 141 MMOL/L
TOTAL PROTEIN: 6.7
TRIGL SERPL-MCNC: 361 MG/DL
VLDLC SERPL CALC-MCNC: 72 MG/DL

## 2019-05-06 DIAGNOSIS — I10 ESSENTIAL HYPERTENSION: ICD-10-CM

## 2019-05-06 DIAGNOSIS — E78.2 MIXED HYPERLIPIDEMIA: ICD-10-CM

## 2019-05-24 ENCOUNTER — OFFICE VISIT (OUTPATIENT)
Dept: FAMILY MEDICINE CLINIC | Age: 59
End: 2019-05-24
Payer: COMMERCIAL

## 2019-05-24 VITALS
SYSTOLIC BLOOD PRESSURE: 140 MMHG | OXYGEN SATURATION: 97 % | TEMPERATURE: 98.6 F | WEIGHT: 203 LBS | HEIGHT: 69 IN | DIASTOLIC BLOOD PRESSURE: 78 MMHG | HEART RATE: 91 BPM | BODY MASS INDEX: 30.07 KG/M2

## 2019-05-24 DIAGNOSIS — E78.2 MIXED HYPERLIPIDEMIA: ICD-10-CM

## 2019-05-24 DIAGNOSIS — F17.210 CIGARETTE NICOTINE DEPENDENCE WITHOUT COMPLICATION: Primary | ICD-10-CM

## 2019-05-24 DIAGNOSIS — I10 ESSENTIAL HYPERTENSION: ICD-10-CM

## 2019-05-24 DIAGNOSIS — N52.9 ERECTILE DYSFUNCTION, UNSPECIFIED ERECTILE DYSFUNCTION TYPE: ICD-10-CM

## 2019-05-24 DIAGNOSIS — H66.90 CHRONIC OTITIS MEDIA, UNSPECIFIED OTITIS MEDIA TYPE: ICD-10-CM

## 2019-05-24 PROBLEM — J40 BRONCHITIS: Status: RESOLVED | Noted: 2018-05-11 | Resolved: 2019-05-24

## 2019-05-24 PROCEDURE — G8427 DOCREV CUR MEDS BY ELIG CLIN: HCPCS | Performed by: FAMILY MEDICINE

## 2019-05-24 PROCEDURE — 4004F PT TOBACCO SCREEN RCVD TLK: CPT | Performed by: FAMILY MEDICINE

## 2019-05-24 PROCEDURE — G8417 CALC BMI ABV UP PARAM F/U: HCPCS | Performed by: FAMILY MEDICINE

## 2019-05-24 PROCEDURE — 99214 OFFICE O/P EST MOD 30 MIN: CPT | Performed by: FAMILY MEDICINE

## 2019-05-24 PROCEDURE — 3017F COLORECTAL CA SCREEN DOC REV: CPT | Performed by: FAMILY MEDICINE

## 2019-05-24 RX ORDER — VARDENAFIL HYDROCHLORIDE 20 MG/1
20 TABLET ORAL PRN
Qty: 30 TABLET | Refills: 3 | Status: SHIPPED
Start: 2019-05-24 | End: 2020-07-10 | Stop reason: SDUPTHER

## 2019-05-24 ASSESSMENT — ENCOUNTER SYMPTOMS
EYES NEGATIVE: 1
RESPIRATORY NEGATIVE: 1
ALLERGIC/IMMUNOLOGIC NEGATIVE: 1
GASTROINTESTINAL NEGATIVE: 1

## 2019-05-24 NOTE — PATIENT INSTRUCTIONS
Take the medication as prescribed  Low-sodium low-fat diet  Regular exercises  ENT referral made  Quit smoking program  Return to clinic earlier if any problems

## 2019-05-24 NOTE — PROGRESS NOTES
OFFICE PROGRESS NOTE      SUBJECTIVE:        Patient ID:   Tri Barba is a 59 y.o. male who presents for   Chief Complaint   Patient presents with    Hypertension     3 mo f/u    Discuss Labs     completed at [employer]. on 5/6/19    Otalgia     Lt ear            HPI:   Patient complaining of left earache  The problem is chronic for years off and on  Lab work showed elevated triglyceride  Patient not following the diet  Status continues to smoke        Prior to Admission medications    Medication Sig Start Date End Date Taking? Authorizing Provider   vardenafil (LEVITRA) 20 MG tablet Take 1 tablet by mouth as needed for Erectile Dysfunction 5/24/19  Yes Soco Ayers MD   PROAIR  (51 Base) MCG/ACT inhaler Inhale 2 puffs into the lungs 4 times daily 5/24/19  Yes Soco Ayers MD   atorvastatin (LIPITOR) 40 MG tablet Take 1 tablet by mouth daily 2/22/19  Yes Soco Ayers MD   hydrochlorothiazide (HYDRODIURIL) 25 MG tablet Take 1 tablet by mouth every morning 11/30/18  Yes Soco Ayers MD   triamcinolone (KENALOG) 0.1 % cream Apply topically 2 times daily 8/31/18  Yes Soco Ayers MD        Social History     Socioeconomic History    Marital status:      Spouse name: None    Number of children: None    Years of education: None    Highest education level: None   Occupational History    None   Social Needs    Financial resource strain: None    Food insecurity:     Worry: None     Inability: None    Transportation needs:     Medical: None     Non-medical: None   Tobacco Use    Smoking status: Current Every Day Smoker     Packs/day: 1.00     Years: 30.00     Pack years: 30.00     Start date: 1/1/1970    Smokeless tobacco: Never Used   Substance and Sexual Activity    Alcohol use:  Yes     Alcohol/week: 2.4 oz     Types: 4 Cans of beer per week    Drug use: No    Sexual activity: None   Lifestyle    Physical activity:     Days per week: None     Minutes per session: None    Stress: None   Relationships    Social connections:     Talks on phone: None     Gets together: None     Attends Yazidi service: None     Active member of club or organization: None     Attends meetings of clubs or organizations: None     Relationship status: None    Intimate partner violence:     Fear of current or ex partner: None     Emotionally abused: None     Physically abused: None     Forced sexual activity: None   Other Topics Concern    None   Social History Narrative    None       I have reviewed Clifton's allergies, medications, problem list, medical, social and family history and have updated as needed in the electronic medical record  Review Of Systems:    Review of Systems   Constitutional: Negative. HENT: Positive for ear pain. Eyes: Negative. Respiratory: Negative. Cardiovascular: Negative. Gastrointestinal: Negative. Endocrine: Negative. Genitourinary: Negative. Musculoskeletal: Negative. Allergic/Immunologic: Negative. Neurological: Negative. Hematological: Negative. Psychiatric/Behavioral: Negative. OBJECTIVE:     VS:  Wt Readings from Last 3 Encounters:   05/24/19 203 lb (92.1 kg)   02/22/19 202 lb (91.6 kg)   12/07/18 201 lb (91.2 kg)     Temp Readings from Last 3 Encounters:   05/24/19 98.6 °F (37 °C)   12/07/18 98.9 °F (37.2 °C) (Oral)   08/10/18 98.4 °F (36.9 °C) (Oral)     BP Readings from Last 3 Encounters:   05/24/19 (!) 140/78   02/22/19 122/80   12/07/18 138/84        Physical Exam   Constitutional: He is oriented to person, place, and time. He appears well-developed and well-nourished. HENT:   Head: Normocephalic and atraumatic. Mouth/Throat: Oropharynx is clear and moist.   Left ear drum is congested   Eyes: Pupils are equal, round, and reactive to light. Conjunctivae are normal.   Neck: Normal range of motion. Neck supple.    Cardiovascular: Normal rate, regular rhythm, normal diet  Regular exercises  ENT referral made  Quit smoking program  Return to clinic earlier if any problems      Return in about 6 weeks (around 7/5/2019). Jose Enrique Bravo reviewed my findings and recommendations with Kennedy Dodge.     Electronicallysigned by Carlos Lira MD on 5/24/19 at 2:53 PM

## 2019-06-12 ENCOUNTER — TELEPHONE (OUTPATIENT)
Dept: FAMILY MEDICINE CLINIC | Age: 59
End: 2019-06-12

## 2019-06-23 LAB
ALBUMIN SERPL-MCNC: 4.7 G/DL
ALP BLD-CCNC: 102 U/L
ALT SERPL-CCNC: 23 U/L
ANION GAP SERPL CALCULATED.3IONS-SCNC: 2.1 MMOL/L
AST SERPL-CCNC: 20 U/L
BASOPHILS ABSOLUTE: 0.1 /ΜL
BASOPHILS RELATIVE PERCENT: 1 %
BILIRUB SERPL-MCNC: 0.4 MG/DL (ref 0.1–1.4)
BUN BLDV-MCNC: 16 MG/DL
CALCIUM SERPL-MCNC: 9.5 MG/DL
CHLORIDE BLD-SCNC: 107 MMOL/L
CHOLESTEROL, TOTAL: 196 MG/DL
CHOLESTEROL/HDL RATIO: NORMAL
CO2: 19 MMOL/L
CREAT SERPL-MCNC: 0.73 MG/DL
EOSINOPHILS ABSOLUTE: 0.4 /ΜL
EOSINOPHILS RELATIVE PERCENT: 3 %
GFR CALCULATED: 101
GLUCOSE BLD-MCNC: 107 MG/DL
HCT VFR BLD CALC: 48.5 % (ref 41–53)
HDLC SERPL-MCNC: 43 MG/DL (ref 35–70)
HEMOGLOBIN: 16.4 G/DL (ref 13.5–17.5)
LDL CHOLESTEROL CALCULATED: 92 MG/DL (ref 0–160)
LYMPHOCYTES ABSOLUTE: 3.2 /ΜL
LYMPHOCYTES RELATIVE PERCENT: 28 %
MCH RBC QN AUTO: 31.2 PG
MCHC RBC AUTO-ENTMCNC: 33.8 G/DL
MCV RBC AUTO: 92 FL
MONOCYTES ABSOLUTE: 0.8 /ΜL
MONOCYTES RELATIVE PERCENT: 7 %
NEUTROPHILS ABSOLUTE: 7 /ΜL
NEUTROPHILS RELATIVE PERCENT: 61 %
PDW BLD-RTO: 13.1 %
PLATELET # BLD: 229 K/ΜL
PMV BLD AUTO: NORMAL FL
POTASSIUM SERPL-SCNC: 4.2 MMOL/L
RBC # BLD: 5.26 10^6/ΜL
SODIUM BLD-SCNC: 143 MMOL/L
TOTAL PROTEIN: 6.9
TRIGL SERPL-MCNC: 306 MG/DL
VLDLC SERPL CALC-MCNC: 61 MG/DL
WBC # BLD: 11.4 10^3/ML

## 2019-06-24 DIAGNOSIS — H66.90 CHRONIC OTITIS MEDIA, UNSPECIFIED OTITIS MEDIA TYPE: ICD-10-CM

## 2019-06-24 DIAGNOSIS — E78.2 MIXED HYPERLIPIDEMIA: ICD-10-CM

## 2019-07-12 ENCOUNTER — PROCEDURE VISIT (OUTPATIENT)
Dept: AUDIOLOGY | Age: 59
End: 2019-07-12
Payer: COMMERCIAL

## 2019-07-12 ENCOUNTER — OFFICE VISIT (OUTPATIENT)
Dept: ENT CLINIC | Age: 59
End: 2019-07-12
Payer: COMMERCIAL

## 2019-07-12 ENCOUNTER — OFFICE VISIT (OUTPATIENT)
Dept: FAMILY MEDICINE CLINIC | Age: 59
End: 2019-07-12
Payer: COMMERCIAL

## 2019-07-12 VITALS
RESPIRATION RATE: 16 BRPM | TEMPERATURE: 97 F | BODY MASS INDEX: 29.77 KG/M2 | WEIGHT: 201 LBS | HEART RATE: 79 BPM | SYSTOLIC BLOOD PRESSURE: 118 MMHG | HEIGHT: 69 IN | DIASTOLIC BLOOD PRESSURE: 78 MMHG | OXYGEN SATURATION: 97 %

## 2019-07-12 VITALS — WEIGHT: 201.7 LBS | BODY MASS INDEX: 29.87 KG/M2 | HEIGHT: 69 IN

## 2019-07-12 DIAGNOSIS — H69.83 ETD (EUSTACHIAN TUBE DYSFUNCTION), BILATERAL: ICD-10-CM

## 2019-07-12 DIAGNOSIS — M26.609 TMJ (TEMPOROMANDIBULAR JOINT DISORDER): Primary | ICD-10-CM

## 2019-07-12 DIAGNOSIS — H90.3 SENSORINEURAL HEARING LOSS, BILATERAL: ICD-10-CM

## 2019-07-12 DIAGNOSIS — N52.9 ERECTILE DYSFUNCTION, UNSPECIFIED ERECTILE DYSFUNCTION TYPE: ICD-10-CM

## 2019-07-12 DIAGNOSIS — F17.210 CIGARETTE NICOTINE DEPENDENCE WITHOUT COMPLICATION: ICD-10-CM

## 2019-07-12 DIAGNOSIS — H90.3 SENSORY HEARING LOSS, BILATERAL: ICD-10-CM

## 2019-07-12 DIAGNOSIS — H93.8X3 SENSATION OF FULLNESS IN BOTH EARS: ICD-10-CM

## 2019-07-12 DIAGNOSIS — E78.2 MIXED HYPERLIPIDEMIA: Primary | ICD-10-CM

## 2019-07-12 DIAGNOSIS — H66.90 CHRONIC OTITIS MEDIA, UNSPECIFIED OTITIS MEDIA TYPE: ICD-10-CM

## 2019-07-12 DIAGNOSIS — I10 ESSENTIAL HYPERTENSION: ICD-10-CM

## 2019-07-12 PROCEDURE — 99214 OFFICE O/P EST MOD 30 MIN: CPT | Performed by: FAMILY MEDICINE

## 2019-07-12 PROCEDURE — G8417 CALC BMI ABV UP PARAM F/U: HCPCS | Performed by: FAMILY MEDICINE

## 2019-07-12 PROCEDURE — G8417 CALC BMI ABV UP PARAM F/U: HCPCS | Performed by: OTOLARYNGOLOGY

## 2019-07-12 PROCEDURE — 4004F PT TOBACCO SCREEN RCVD TLK: CPT | Performed by: OTOLARYNGOLOGY

## 2019-07-12 PROCEDURE — 3017F COLORECTAL CA SCREEN DOC REV: CPT | Performed by: FAMILY MEDICINE

## 2019-07-12 PROCEDURE — G8427 DOCREV CUR MEDS BY ELIG CLIN: HCPCS | Performed by: FAMILY MEDICINE

## 2019-07-12 PROCEDURE — 99203 OFFICE O/P NEW LOW 30 MIN: CPT | Performed by: OTOLARYNGOLOGY

## 2019-07-12 PROCEDURE — 3017F COLORECTAL CA SCREEN DOC REV: CPT | Performed by: OTOLARYNGOLOGY

## 2019-07-12 PROCEDURE — 92567 TYMPANOMETRY: CPT | Performed by: AUDIOLOGIST

## 2019-07-12 PROCEDURE — 4004F PT TOBACCO SCREEN RCVD TLK: CPT | Performed by: FAMILY MEDICINE

## 2019-07-12 PROCEDURE — 92557 COMPREHENSIVE HEARING TEST: CPT | Performed by: AUDIOLOGIST

## 2019-07-12 PROCEDURE — G8427 DOCREV CUR MEDS BY ELIG CLIN: HCPCS | Performed by: OTOLARYNGOLOGY

## 2019-07-12 RX ORDER — ATORVASTATIN CALCIUM 40 MG/1
40 TABLET, FILM COATED ORAL DAILY
Qty: 30 TABLET | Refills: 3 | Status: SHIPPED | OUTPATIENT
Start: 2019-07-12 | End: 2019-11-22 | Stop reason: SDUPTHER

## 2019-07-12 RX ORDER — FENOFIBRATE 48 MG/1
48 TABLET, COATED ORAL DAILY
Qty: 30 TABLET | Refills: 3 | Status: SHIPPED | OUTPATIENT
Start: 2019-07-12 | End: 2019-12-05 | Stop reason: SDUPTHER

## 2019-07-12 RX ORDER — FLUTICASONE PROPIONATE 50 MCG
2 SPRAY, SUSPENSION (ML) NASAL DAILY
Qty: 1 BOTTLE | Refills: 3 | Status: SHIPPED | OUTPATIENT
Start: 2019-07-12 | End: 2019-11-22 | Stop reason: SDUPTHER

## 2019-07-12 ASSESSMENT — ENCOUNTER SYMPTOMS
RESPIRATORY NEGATIVE: 1
ALLERGIC/IMMUNOLOGIC NEGATIVE: 1
GASTROINTESTINAL NEGATIVE: 1
EYES NEGATIVE: 1

## 2019-07-12 NOTE — PROGRESS NOTES
DEPARTMENT OF OTOLARYNGOLOGY   HISTORY AND PHYSICAL      PATIENT: Omid Bolton : 1960 (1 y.o.)   DATE: 2019  REFERRED BY: Amy Irving MD  [id number] [facility], [facility] [address]      HISTORY OBTAINED FROM:  patient    CHIEF COMPLAINT:  had concerns including Otitis Media (ear infection on and off for 20+ years; worse in winter; feels pretty plugged up today; worse with cold air). HISTORY OF PRESENT ILLNESS:                                                                                        Omid Bolton is a(n) 59 y.o. male presents to the office today as a new patient for evaluation of his plugged left ear and occasional left sided otitis media for the past 20 years. Patient states he has left sided muffled sound, pressure, pain and swelling intermittently and worsen during the cold weather. He has had about 3-4 episodes of left sided otitis media per year for the past 20 years. They usually clear with abx. Patient has allergy symptoms with nasal congestion, nasal drainage, watery eyes. They are controlled with Benadryl. He has never been on Flonase or any other nasal sprays. No history of PE tubes. No history of head and neck surgery. Patient has 30 pack years of tobacco smoking history.      Past Medical History:   Diagnosis Date    Hyperlipidemia         Past Surgical History:   Procedure Laterality Date    CORONARY ANGIOPLASTY WITH STENT PLACEMENT      THYROID SURGERY         Current Outpatient Medications:     vardenafil (LEVITRA) 20 MG tablet, Take 1 tablet by mouth as needed for Erectile Dysfunction, Disp: 30 tablet, Rfl: 3    PROAIR  (90 Base) MCG/ACT inhaler, Inhale 2 puffs into the lungs 4 times daily, Disp: 3 Inhaler, Rfl: 0    atorvastatin (LIPITOR) 40 MG tablet, Take 1 tablet by mouth daily, Disp: 90 tablet, Rfl: 0    hydrochlorothiazide (HYDRODIURIL) 25 MG tablet, Take 1 tablet by mouth every morning, Disp: 90 tablet, Rfl: 1    triamcinolone (KENALOG) Residency  7/12/2019  8:53 AM          Birtha Ohs  1960      I have discussed the case, including pertinent history and exam findings with the resident. I have seen and examined the patient and the key elements of the encounter have been performed by me. I agree with the assessment, plan and orders as documented by the resident. Patient here for follow up of medical problems. Remainder of medical problems as per resident note.       [address], DO  7/25/19

## 2019-07-12 NOTE — LETTER
[facility]  [address] [place] [address]  Phone: [phone]  Fax: [address] [address] [address], DO        July 12, 2019     Patient: Jamie Gage   YOB: 1960   Date of Visit: 7/12/2019       To Whom it May Concern:    Meghann Granados was seen in my clinic on 7/12/2019. He may return to work on 7/13/2019. If you have any questions or concerns, please don't hesitate to call.     Sincerely,         Keegan Harmon, DO

## 2019-07-12 NOTE — PROGRESS NOTES
OFFICE PROGRESS NOTE      SUBJECTIVE:        Patient ID:   Edmund Jean is a 59 y.o. male who presents for   Chief Complaint   Patient presents with    Hyperlipidemia    Discuss Labs           HPI:     Patient states he is feeling better  Lab work reviewed  Triglycerides still elevated  Patient states he is not following the diet  Not exercising  Still continues to smoke  Seen by the ENT today  Diagnosed with allergic rhinitis      Prior to Admission medications    Medication Sig Start Date End Date Taking?  Authorizing Provider   fluticasone (FLONASE) 50 MCG/ACT nasal spray 2 sprays by Nasal route daily 2 sprays each nostril once daily 7/12/19  Yes Андрей Phan DO   atorvastatin (LIPITOR) 40 MG tablet Take 1 tablet by mouth daily 7/12/19  Yes Cecilia Albarran MD   triamcinolone (KENALOG) 0.1 % ointment Apply topically 2 times daily for 7 days 7/12/19 7/19/19 Yes Cecilia Albarran MD   fenofibrate (TRICOR) 48 MG tablet Take 1 tablet by mouth daily 7/12/19  Yes Cecilia Albarran MD   vardenafil (LEVITRA) 20 MG tablet Take 1 tablet by mouth as needed for Erectile Dysfunction 5/24/19  Yes Cecilia Albarran MD   PROAIR  (42 Base) MCG/ACT inhaler Inhale 2 puffs into the lungs 4 times daily 5/24/19  Yes Cecilia Albarran MD   hydrochlorothiazide (HYDRODIURIL) 25 MG tablet Take 1 tablet by mouth every morning 11/30/18  Yes Cecilia Albarran MD        Social History     Socioeconomic History    Marital status:      Spouse name: None    Number of children: None    Years of education: None    Highest education level: None   Occupational History    None   Social Needs    Financial resource strain: None    Food insecurity:     Worry: None     Inability: None    Transportation needs:     Medical: None     Non-medical: None   Tobacco Use    Smoking status: Current Every Day Smoker     Packs/day: 1.00     Years: 30.00     Pack years: 30.00     Start date:

## 2019-08-11 LAB
ALBUMIN SERPL-MCNC: 4.4 G/DL
ALP BLD-CCNC: 90 U/L
ALT SERPL-CCNC: 19 U/L
ANION GAP SERPL CALCULATED.3IONS-SCNC: NORMAL MMOL/L
AST SERPL-CCNC: 20 U/L
BILIRUB SERPL-MCNC: 0.5 MG/DL (ref 0.1–1.4)
BUN BLDV-MCNC: 13 MG/DL
CALCIUM SERPL-MCNC: 9.6 MG/DL
CHLORIDE BLD-SCNC: 105 MMOL/L
CHOLESTEROL, TOTAL: 174 MG/DL
CHOLESTEROL/HDL RATIO: NORMAL
CO2: 20 MMOL/L
CREAT SERPL-MCNC: 0.86 MG/DL
GFR CALCULATED: 94
GLUCOSE BLD-MCNC: 100 MG/DL
HDLC SERPL-MCNC: 43 MG/DL (ref 35–70)
LDL CHOLESTEROL CALCULATED: 90 MG/DL (ref 0–160)
POTASSIUM SERPL-SCNC: 4.2 MMOL/L
SODIUM BLD-SCNC: 141 MMOL/L
TOTAL PROTEIN: 6.9
TRIGL SERPL-MCNC: 207 MG/DL
VLDLC SERPL CALC-MCNC: 41 MG/DL

## 2019-08-12 DIAGNOSIS — E78.2 MIXED HYPERLIPIDEMIA: ICD-10-CM

## 2019-08-12 DIAGNOSIS — I10 ESSENTIAL HYPERTENSION: ICD-10-CM

## 2019-08-19 ENCOUNTER — OFFICE VISIT (OUTPATIENT)
Dept: ENT CLINIC | Age: 59
End: 2019-08-19
Payer: COMMERCIAL

## 2019-08-19 ENCOUNTER — PROCEDURE VISIT (OUTPATIENT)
Dept: AUDIOLOGY | Age: 59
End: 2019-08-19
Payer: COMMERCIAL

## 2019-08-19 VITALS
WEIGHT: 200 LBS | BODY MASS INDEX: 29.62 KG/M2 | SYSTOLIC BLOOD PRESSURE: 140 MMHG | DIASTOLIC BLOOD PRESSURE: 82 MMHG | HEIGHT: 69 IN | HEART RATE: 76 BPM

## 2019-08-19 DIAGNOSIS — H93.8X3 PRESSURE SENSATION IN BOTH EARS: ICD-10-CM

## 2019-08-19 DIAGNOSIS — H93.8X3 EAR PRESSURE, BILATERAL: Primary | ICD-10-CM

## 2019-08-19 PROCEDURE — 99213 OFFICE O/P EST LOW 20 MIN: CPT | Performed by: OTOLARYNGOLOGY

## 2019-08-19 PROCEDURE — G8417 CALC BMI ABV UP PARAM F/U: HCPCS | Performed by: OTOLARYNGOLOGY

## 2019-08-19 PROCEDURE — 92567 TYMPANOMETRY: CPT | Performed by: AUDIOLOGIST

## 2019-08-19 PROCEDURE — G8427 DOCREV CUR MEDS BY ELIG CLIN: HCPCS | Performed by: OTOLARYNGOLOGY

## 2019-08-19 PROCEDURE — 3017F COLORECTAL CA SCREEN DOC REV: CPT | Performed by: OTOLARYNGOLOGY

## 2019-08-19 PROCEDURE — 4004F PT TOBACCO SCREEN RCVD TLK: CPT | Performed by: OTOLARYNGOLOGY

## 2019-08-19 NOTE — PROGRESS NOTES
for heat and cold intolerance. Neurological: Negative for focal weaknessor seizure   Skin: Negative for rash and itchiness   Psychiatric: Negative for depression and hallucinations     PHYSICAL EXAM:                                                                                                                BP (!) 140/82   Pulse 76   Ht 5' 9\" (1.753 m)   Wt 200 lb (90.7 kg)   BMI 29.53 kg/m²   Physical Exam  Constitutional: Appears well-developed and well-nourished. Appears as stated age. Eyes: Lids and lashes normal, pupils equal, extra ocular muscles intact, sclera clear   ENT: Normocephalic, without obvious abnormality, atraumatic, sinuses nontender on palpation, external ears without lesions, bilateral EACs and TMs WNL, oral pharynx with moist mucus membranes, crepitus and exquisite pain of L TMJ   Neck:  Supple, symmetrical,trachea midline, no adenopathy, thyroid symmetric, not enlarged and no tenderness, skin normal   Respiratory: No increased work of breathing. No stridor or wheezes   Cardiovascular: Regular rate   Skin: Warm and dry   Neurologic:  Alert and oriented     ASSESSMENTAND PLAN:                                                                                                   59 y.o. male presents with ear pain possible allergic rhinitis and ETD    · Continue Flonase  · Continue warm compresses, mouth guard, soft food, and breakthrough Ibuprofen for the TMJ pain  · Follow up in 4 month(s) or sooner if symptoms acutely exacerbate    Patient was seen and examined with attending physician, who agrees with the assessment andplans. Margot Murrieta DO  Resident Physician  [facility]  Otolaryngology Residency  8/19/2019  3:26 PM            [facility]  1960      I have discussed the case, including pertinent history and exam findings with the resident. I have seen and examined the patient and the key elements of the encounter have been performed by me.   I agree with the assessment, plan and orders as documented by the resident. Patient here for follow up of medical problems. Remainder of medical problems as per resident note.       [address] [facility], DO  8/30/19

## 2019-08-19 NOTE — PROGRESS NOTES
This patient was referred for tympanometric testing by Dr. Trenton Velasquez. Patient being seen for a one-month follow-up, after using Flonase. Tympanometry revealed normal middle ear peak pressure and compliance, bilaterally. Ipsilateral acoustic reflexes were present, bilaterally at 1000Hz. The results were reviewed with the patient. Recommendations for follow up will be made pending physician consult.     Lexis Hackett

## 2019-08-19 NOTE — LETTER
[facility]  [id number] [place] [id number]  Phone: [phone]  Fax: Sigifredo Willlauren DAVIS [address], DO        August 19, 2019     Patient: Reyes Weir   YOB: 1960   Date of Visit: 8/19/2019       To Whom it May Concern:    Ady Teixeira was seen in my clinic on 8/19/2019. He may return to work on 8/19/2019. If you have any questions or concerns, please don't hesitate to call.     Sincerely,         Sharyle Hurt, DO

## 2019-08-23 ENCOUNTER — OFFICE VISIT (OUTPATIENT)
Dept: FAMILY MEDICINE CLINIC | Age: 59
End: 2019-08-23
Payer: COMMERCIAL

## 2019-08-23 VITALS
WEIGHT: 201 LBS | BODY MASS INDEX: 29.77 KG/M2 | HEIGHT: 69 IN | DIASTOLIC BLOOD PRESSURE: 86 MMHG | OXYGEN SATURATION: 98 % | TEMPERATURE: 97.3 F | HEART RATE: 85 BPM | SYSTOLIC BLOOD PRESSURE: 112 MMHG

## 2019-08-23 DIAGNOSIS — E78.2 MIXED HYPERLIPIDEMIA: Primary | ICD-10-CM

## 2019-08-23 DIAGNOSIS — I10 ESSENTIAL HYPERTENSION: ICD-10-CM

## 2019-08-23 DIAGNOSIS — F17.210 CIGARETTE NICOTINE DEPENDENCE WITHOUT COMPLICATION: ICD-10-CM

## 2019-08-23 DIAGNOSIS — L30.9 DERMATITIS: ICD-10-CM

## 2019-08-23 PROCEDURE — 4004F PT TOBACCO SCREEN RCVD TLK: CPT | Performed by: FAMILY MEDICINE

## 2019-08-23 PROCEDURE — G8417 CALC BMI ABV UP PARAM F/U: HCPCS | Performed by: FAMILY MEDICINE

## 2019-08-23 PROCEDURE — 99214 OFFICE O/P EST MOD 30 MIN: CPT | Performed by: FAMILY MEDICINE

## 2019-08-23 PROCEDURE — G8427 DOCREV CUR MEDS BY ELIG CLIN: HCPCS | Performed by: FAMILY MEDICINE

## 2019-08-23 PROCEDURE — 3017F COLORECTAL CA SCREEN DOC REV: CPT | Performed by: FAMILY MEDICINE

## 2019-08-23 ASSESSMENT — ENCOUNTER SYMPTOMS
EYES NEGATIVE: 1
RESPIRATORY NEGATIVE: 1
GASTROINTESTINAL NEGATIVE: 1
ALLERGIC/IMMUNOLOGIC NEGATIVE: 1

## 2019-08-23 NOTE — PROGRESS NOTES
OFFICE PROGRESS NOTE      SUBJECTIVE:        Patient ID:   Claudia Hernandez is a 59 y.o. male who presents for   Chief Complaint   Patient presents with    Hyperlipidemia     6 week f/u    Geoffrey Cunqueiro 53 done on 8/10/19    Other     requesting cream given in past for poison ivy. HPI:   Patient states he is feeling better  Does get a rash from the poison ivy  Lab work is improving  Patient states continues to smoke  States he is following the diet and exercise        Prior to Visit Medications    Medication Sig Taking? Authorizing Provider   PROAIR  (75 Base) MCG/ACT inhaler Inhale 2 puffs into the lungs 4 times daily Yes Luzmaria Burnett MD   fluticasone (FLONASE) 50 MCG/ACT nasal spray 2 sprays by Nasal route daily 2 sprays each nostril once daily Yes Андрей Phan DO   atorvastatin (LIPITOR) 40 MG tablet Take 1 tablet by mouth daily Yes Luzmaria Burnett MD   fenofibrate (TRICOR) 48 MG tablet Take 1 tablet by mouth daily Yes Luzmaria Burnett MD   vardenafil (LEVITRA) 20 MG tablet Take 1 tablet by mouth as needed for Erectile Dysfunction Yes Luzmaria Burnett MD   hydrochlorothiazide (HYDRODIURIL) 25 MG tablet Take 1 tablet by mouth every morning Yes Luzmaria Burnett MD      Social History     Socioeconomic History    Marital status:      Spouse name: None    Number of children: None    Years of education: None    Highest education level: None   Occupational History    None   Social Needs    Financial resource strain: None    Food insecurity:     Worry: None     Inability: None    Transportation needs:     Medical: None     Non-medical: None   Tobacco Use    Smoking status: Current Every Day Smoker     Packs/day: 1.00     Years: 30.00     Pack years: 30.00     Start date: 1/1/1970    Smokeless tobacco: Never Used   Substance and Sexual Activity    Alcohol use:  Yes     Alcohol/week: 4.0 standard drinks     Types: 4 Cans of

## 2019-11-10 LAB
ALBUMIN SERPL-MCNC: 4.6 G/DL
ALP BLD-CCNC: 82 U/L
ALT SERPL-CCNC: 17 U/L
ANION GAP SERPL CALCULATED.3IONS-SCNC: NORMAL MMOL/L
AST SERPL-CCNC: 21 U/L
BASOPHILS ABSOLUTE: 0.1 /ΜL
BASOPHILS RELATIVE PERCENT: 1 %
BILIRUB SERPL-MCNC: 0.5 MG/DL (ref 0.1–1.4)
BUN BLDV-MCNC: 15 MG/DL
CALCIUM SERPL-MCNC: 9.6 MG/DL
CHLORIDE BLD-SCNC: 105 MMOL/L
CHOLESTEROL, TOTAL: 195 MG/DL
CHOLESTEROL/HDL RATIO: NORMAL
CO2: 20 MMOL/L
CREAT SERPL-MCNC: 0.84 MG/DL
EOSINOPHILS ABSOLUTE: 0.4 /ΜL
EOSINOPHILS RELATIVE PERCENT: 4 %
GFR CALCULATED: 95
GLUCOSE BLD-MCNC: 98 MG/DL
HCT VFR BLD CALC: 47.2 % (ref 41–53)
HDLC SERPL-MCNC: 45 MG/DL (ref 35–70)
HEMOGLOBIN: 16.2 G/DL (ref 13.5–17.5)
LDL CHOLESTEROL CALCULATED: 107 MG/DL (ref 0–160)
LYMPHOCYTES ABSOLUTE: 3.8 /ΜL
LYMPHOCYTES RELATIVE PERCENT: 35 %
MCH RBC QN AUTO: 31.5 PG
MCHC RBC AUTO-ENTMCNC: 34.3 G/DL
MCV RBC AUTO: 92 FL
MONOCYTES ABSOLUTE: 0.8 /ΜL
MONOCYTES RELATIVE PERCENT: 7 %
NEUTROPHILS ABSOLUTE: 5.7 /ΜL
NEUTROPHILS RELATIVE PERCENT: 52 %
PDW BLD-RTO: NORMAL %
PLATELET # BLD: 230 K/ΜL
PMV BLD AUTO: NORMAL FL
POTASSIUM SERPL-SCNC: 4.2 MMOL/L
RBC # BLD: 5.14 10^6/ΜL
SODIUM BLD-SCNC: 140 MMOL/L
TOTAL PROTEIN: 6.8
TRIGL SERPL-MCNC: 216 MG/DL
VLDLC SERPL CALC-MCNC: 43 MG/DL
WBC # BLD: 10.8 10^3/ML

## 2019-11-12 DIAGNOSIS — I10 ESSENTIAL HYPERTENSION: ICD-10-CM

## 2019-11-12 DIAGNOSIS — E78.2 MIXED HYPERLIPIDEMIA: ICD-10-CM

## 2019-11-12 DIAGNOSIS — L30.9 DERMATITIS: ICD-10-CM

## 2019-11-22 ENCOUNTER — OFFICE VISIT (OUTPATIENT)
Dept: FAMILY MEDICINE CLINIC | Age: 59
End: 2019-11-22
Payer: COMMERCIAL

## 2019-11-22 VITALS
DIASTOLIC BLOOD PRESSURE: 78 MMHG | WEIGHT: 199 LBS | HEART RATE: 88 BPM | BODY MASS INDEX: 29.47 KG/M2 | HEIGHT: 69 IN | OXYGEN SATURATION: 98 % | SYSTOLIC BLOOD PRESSURE: 130 MMHG

## 2019-11-22 DIAGNOSIS — N52.9 ERECTILE DYSFUNCTION, UNSPECIFIED ERECTILE DYSFUNCTION TYPE: ICD-10-CM

## 2019-11-22 DIAGNOSIS — I10 ESSENTIAL HYPERTENSION: ICD-10-CM

## 2019-11-22 DIAGNOSIS — E78.2 MIXED HYPERLIPIDEMIA: ICD-10-CM

## 2019-11-22 DIAGNOSIS — Z23 NEED FOR VACCINATION WITH 13-POLYVALENT PNEUMOCOCCAL CONJUGATE VACCINE: ICD-10-CM

## 2019-11-22 DIAGNOSIS — F17.210 CIGARETTE NICOTINE DEPENDENCE WITHOUT COMPLICATION: ICD-10-CM

## 2019-11-22 DIAGNOSIS — Z23 NEED FOR INFLUENZA VACCINATION: Primary | ICD-10-CM

## 2019-11-22 PROCEDURE — 90670 PCV13 VACCINE IM: CPT | Performed by: FAMILY MEDICINE

## 2019-11-22 PROCEDURE — 99214 OFFICE O/P EST MOD 30 MIN: CPT | Performed by: FAMILY MEDICINE

## 2019-11-22 PROCEDURE — 4004F PT TOBACCO SCREEN RCVD TLK: CPT | Performed by: FAMILY MEDICINE

## 2019-11-22 PROCEDURE — 90686 IIV4 VACC NO PRSV 0.5 ML IM: CPT | Performed by: FAMILY MEDICINE

## 2019-11-22 PROCEDURE — 3017F COLORECTAL CA SCREEN DOC REV: CPT | Performed by: FAMILY MEDICINE

## 2019-11-22 PROCEDURE — G8482 FLU IMMUNIZE ORDER/ADMIN: HCPCS | Performed by: FAMILY MEDICINE

## 2019-11-22 PROCEDURE — 90472 IMMUNIZATION ADMIN EACH ADD: CPT | Performed by: FAMILY MEDICINE

## 2019-11-22 PROCEDURE — G8427 DOCREV CUR MEDS BY ELIG CLIN: HCPCS | Performed by: FAMILY MEDICINE

## 2019-11-22 PROCEDURE — 90471 IMMUNIZATION ADMIN: CPT | Performed by: FAMILY MEDICINE

## 2019-11-22 PROCEDURE — G8417 CALC BMI ABV UP PARAM F/U: HCPCS | Performed by: FAMILY MEDICINE

## 2019-11-22 RX ORDER — ATORVASTATIN CALCIUM 40 MG/1
40 TABLET, FILM COATED ORAL DAILY
Qty: 30 TABLET | Refills: 3 | Status: SHIPPED
Start: 2019-11-22 | End: 2020-02-28 | Stop reason: SDUPTHER

## 2019-11-22 RX ORDER — FLUTICASONE PROPIONATE 50 MCG
2 SPRAY, SUSPENSION (ML) NASAL DAILY
Qty: 1 BOTTLE | Refills: 3 | Status: SHIPPED
Start: 2019-11-22 | End: 2020-02-28 | Stop reason: SDUPTHER

## 2019-11-22 ASSESSMENT — ENCOUNTER SYMPTOMS
RESPIRATORY NEGATIVE: 1
ALLERGIC/IMMUNOLOGIC NEGATIVE: 1
EYES NEGATIVE: 1
GASTROINTESTINAL NEGATIVE: 1

## 2019-12-05 RX ORDER — FENOFIBRATE 48 MG/1
48 TABLET, COATED ORAL DAILY
Qty: 30 TABLET | Refills: 3 | Status: SHIPPED
Start: 2019-12-05 | End: 2020-02-28 | Stop reason: SDUPTHER

## 2020-01-21 ENCOUNTER — HOSPITAL ENCOUNTER (EMERGENCY)
Age: 60
Discharge: HOME | End: 2020-01-21
Payer: COMMERCIAL

## 2020-01-21 VITALS
TEMPERATURE: 98 F | RESPIRATION RATE: 16 BRPM | OXYGEN SATURATION: 96 % | HEART RATE: 91 BPM | DIASTOLIC BLOOD PRESSURE: 94 MMHG | WEIGHT: 200 LBS | BODY MASS INDEX: 29.53 KG/M2 | SYSTOLIC BLOOD PRESSURE: 162 MMHG

## 2020-01-21 PROCEDURE — 99212 OFFICE O/P EST SF 10 MIN: CPT

## 2020-01-21 RX ORDER — AMOXICILLIN AND CLAVULANATE POTASSIUM 875; 125 MG/1; MG/1
1 TABLET, FILM COATED ORAL 2 TIMES DAILY
Qty: 20 TABLET | Refills: 0 | Status: SHIPPED | OUTPATIENT
Start: 2020-01-21 | End: 2020-01-31

## 2020-01-21 RX ORDER — PREDNISONE 20 MG/1
40 TABLET ORAL DAILY
Qty: 10 TABLET | Refills: 0 | Status: SHIPPED | OUTPATIENT
Start: 2020-01-21 | End: 2020-01-26

## 2020-01-21 ASSESSMENT — PAIN DESCRIPTION - LOCATION: LOCATION: EAR

## 2020-01-21 ASSESSMENT — PAIN SCALES - GENERAL: PAINLEVEL_OUTOF10: 10

## 2020-01-21 ASSESSMENT — PAIN DESCRIPTION - ORIENTATION: ORIENTATION: LEFT

## 2020-01-21 NOTE — ED PROVIDER NOTES
HPI: Ryley Vargas is a 62 y.o. male with a past medical history of  has a past medical history of Hyperlipidemia. 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 Does not have a fever does not have body aches does not have chest pain or shortness of breath does have a slight cough. States he has a history of COPD. Review of Systems:   Pertinent positives and negatives are stated within HPI, all other systems reviewed and are negative.          --------------------------------------------- PAST HISTORY ---------------------------------------------  Past Medical History:  has a past medical history of Hyperlipidemia. Past Surgical History:  has a past surgical history that includes Coronary angioplasty with stent and Thyroid surgery. Social History:  reports that he has been smoking. He started smoking about 50 years ago. He has a 30.00 pack-year smoking history. He has never used smokeless tobacco. He reports current alcohol use of about 4.0 standard drinks of alcohol per week. He reports that he does not use drugs. Family History: family history includes Cancer in his sister; Other in his mother. The patients home medications have been reviewed. Allergies: Sulfa antibiotics    ------------------------- NURSING NOTES AND VITALS REVIEWED ---------------------------   The nursing notes within the ED encounter and vital signs as below have been reviewed by myself. BP (!) 162/94   Pulse 91   Temp 98 °F (36.7 °C) (Oral)   Resp 16   Wt 200 lb (90.7 kg)   SpO2 96%   BMI 29.53 kg/m²   Oxygen Saturation Interpretation: Normal    The patients available past medical records and past encounters were reviewed.               Physical exam:  Constitutional: Vital signs are reviewed the patient is comfortable. The patient is alert and oriented and conversant. Head: The head is atraumatic and normocephalic. Eyes: No discharge is present from the eyes. The sclera are normal.  ENT: The oropharynx demonstrates a small amount of erythema bilaterally. There is no tonsillar enlargement nor is there any exudate present. No uvular deviation or edema. No tonsillary asymmetry.  Floor of the mouth soft, no trismus, handling secretions. TMs bilaterally demonstrate dullness, tender over the sinuses  Neck: Normal range of motion is achieved in the neck. There is no JVD present. No meningeal signs are present   Anterior cervical adenopathy is negative. Respiratory/chest: . Breath sounds occasional faint wheeze. There is no respiratory distress.   Cardiovascular: Heart shows a regular rate and rhythm without murmurs clicks or gallops. Abdominal exam: The abdomen is non tender without evidence of peritoneal signs. Specific attention to the left upper quadrant with palpation of the spleen demonstrates no organomegaly or tenderness  Skin: warm and dry, without rash  Neurologic: GCS 15   Psych: Normal Affect  -------------------------------------------------- RESULTS -------------------------------------------------    LABS:  No results found for this visit on 01/21/20. RADIOLOGY:  Interpreted by Radiologist.  No orders to display           ------------------------------ ED COURSE/MEDICAL DECISION MAKING----------------------  Medications - No data to display    ED COURSE:         Medical Decision Making:         Sore throat green sinus drainage pressure over his sinuses and ear pressure I did put him on Augmentin for the sinusitis and also some prednisone he has a slight expiratory wheeze he does have some COPD.   Advised if he has any worsening symptoms he needs to get reevaluated he does not have any influenza symptoms at this time he does not have a fever body aches or chills  Counseling: The emergency provider has spoken with the patient and discussed todays results, in addition to providing specific details for the plan of care and counseling regarding the diagnosis and prognosis. Questions are answered at this time and they are agreeable with the plan.       --------------------------------- IMPRESSION AND DISPOSITION ---------------------------------    IMPRESSION  1. Acute sinusitis, recurrence not specified, unspecified location    2. Acute pharyngitis, unspecified etiology    3.  Chronic obstructive pulmonary disease, unspecified COPD type ([facility]        DISPOSITION  Disposition: Discharge to home  Patient condition is good           BRI Barnes CNP  01/21/20 [id number]

## 2020-02-23 LAB
ALBUMIN SERPL-MCNC: 4.6 G/DL
ALP BLD-CCNC: 90 U/L
ALT SERPL-CCNC: 19 U/L
ANION GAP SERPL CALCULATED.3IONS-SCNC: NORMAL MMOL/L
AST SERPL-CCNC: 18 U/L
BASOPHILS ABSOLUTE: 0.1 /ΜL
BASOPHILS RELATIVE PERCENT: 1 %
BILIRUB SERPL-MCNC: 0.3 MG/DL (ref 0.1–1.4)
BUN BLDV-MCNC: 17 MG/DL
CALCIUM SERPL-MCNC: 9.9 MG/DL
CHLORIDE BLD-SCNC: 104 MMOL/L
CHOLESTEROL, TOTAL: 188 MG/DL
CHOLESTEROL/HDL RATIO: NORMAL
CO2: 18 MMOL/L
CREAT SERPL-MCNC: 0.96 MG/DL
EOSINOPHILS ABSOLUTE: 0.4 /ΜL
EOSINOPHILS RELATIVE PERCENT: 3 %
GFR CALCULATED: NORMAL
GLUCOSE BLD-MCNC: 104 MG/DL
HCT VFR BLD CALC: 52.2 % (ref 41–53)
HDLC SERPL-MCNC: 49 MG/DL (ref 35–70)
HEMOGLOBIN: 17.1 G/DL (ref 13.5–17.5)
LDL CHOLESTEROL CALCULATED: 103 MG/DL (ref 0–160)
LYMPHOCYTES ABSOLUTE: 2.9 /ΜL
LYMPHOCYTES RELATIVE PERCENT: 24 %
MCH RBC QN AUTO: 30.2 PG
MCHC RBC AUTO-ENTMCNC: 32.8 G/DL
MCV RBC AUTO: 92 FL
MONOCYTES ABSOLUTE: 0.9 /ΜL
MONOCYTES RELATIVE PERCENT: 8 %
NEUTROPHILS ABSOLUTE: 7.6 /ΜL
NEUTROPHILS RELATIVE PERCENT: 63 %
PLATELET # BLD: 276 K/ΜL
PMV BLD AUTO: NORMAL FL
POTASSIUM SERPL-SCNC: 4.6 MMOL/L
RBC # BLD: 5.67 10^6/ΜL
SODIUM BLD-SCNC: 140 MMOL/L
TOTAL PROTEIN: 7.1
TRIGL SERPL-MCNC: 179 MG/DL
VLDLC SERPL CALC-MCNC: 36 MG/DL
WBC # BLD: 11.9 10^3/ML

## 2020-02-28 ENCOUNTER — OFFICE VISIT (OUTPATIENT)
Dept: FAMILY MEDICINE CLINIC | Age: 60
End: 2020-02-28
Payer: COMMERCIAL

## 2020-02-28 VITALS
HEIGHT: 69 IN | SYSTOLIC BLOOD PRESSURE: 126 MMHG | OXYGEN SATURATION: 97 % | DIASTOLIC BLOOD PRESSURE: 82 MMHG | TEMPERATURE: 98.5 F | BODY MASS INDEX: 29.9 KG/M2 | WEIGHT: 201.9 LBS | HEART RATE: 88 BPM

## 2020-02-28 PROCEDURE — G8482 FLU IMMUNIZE ORDER/ADMIN: HCPCS | Performed by: FAMILY MEDICINE

## 2020-02-28 PROCEDURE — 3017F COLORECTAL CA SCREEN DOC REV: CPT | Performed by: FAMILY MEDICINE

## 2020-02-28 PROCEDURE — G8427 DOCREV CUR MEDS BY ELIG CLIN: HCPCS | Performed by: FAMILY MEDICINE

## 2020-02-28 PROCEDURE — 4004F PT TOBACCO SCREEN RCVD TLK: CPT | Performed by: FAMILY MEDICINE

## 2020-02-28 PROCEDURE — G8417 CALC BMI ABV UP PARAM F/U: HCPCS | Performed by: FAMILY MEDICINE

## 2020-02-28 PROCEDURE — 99214 OFFICE O/P EST MOD 30 MIN: CPT | Performed by: FAMILY MEDICINE

## 2020-02-28 RX ORDER — CHLORHEXIDINE GLUCONATE 0.12 MG/ML
RINSE ORAL
COMMUNITY
Start: 2020-02-09 | End: 2020-02-28

## 2020-02-28 RX ORDER — ATORVASTATIN CALCIUM 40 MG/1
40 TABLET, FILM COATED ORAL DAILY
Qty: 30 TABLET | Refills: 3 | Status: SHIPPED
Start: 2020-02-28 | End: 2020-07-10 | Stop reason: SDUPTHER

## 2020-02-28 RX ORDER — BROMPHENIRAMINE MALEATE, PSEUDOEPHEDRINE HYDROCHLORIDE, AND DEXTROMETHORPHAN HYDROBROMIDE 2; 30; 10 MG/5ML; MG/5ML; MG/5ML
SYRUP ORAL
COMMUNITY
Start: 2020-01-17 | End: 2020-02-28

## 2020-02-28 RX ORDER — FLUTICASONE PROPIONATE 50 MCG
2 SPRAY, SUSPENSION (ML) NASAL DAILY
Qty: 1 BOTTLE | Refills: 3 | Status: SHIPPED
Start: 2020-02-28 | End: 2020-07-10 | Stop reason: SDUPTHER

## 2020-02-28 RX ORDER — FENOFIBRATE 48 MG/1
48 TABLET, COATED ORAL DAILY
Qty: 30 TABLET | Refills: 3 | Status: SHIPPED
Start: 2020-02-28 | End: 2020-07-10 | Stop reason: SDUPTHER

## 2020-02-28 ASSESSMENT — PATIENT HEALTH QUESTIONNAIRE - PHQ9
SUM OF ALL RESPONSES TO PHQ9 QUESTIONS 1 & 2: 0
1. LITTLE INTEREST OR PLEASURE IN DOING THINGS: 0
2. FEELING DOWN, DEPRESSED OR HOPELESS: 0
SUM OF ALL RESPONSES TO PHQ QUESTIONS 1-9: 0
SUM OF ALL RESPONSES TO PHQ QUESTIONS 1-9: 0

## 2020-02-28 NOTE — PROGRESS NOTES
Conjunctiva/sclera: Conjunctivae normal.      Pupils: Pupils are equal, round, and reactive to light. Neck:      Musculoskeletal: Normal range of motion and neck supple. Cardiovascular:      Rate and Rhythm: Normal rate and regular rhythm. Heart sounds: Normal heart sounds. Pulmonary:      Effort: Pulmonary effort is normal.      Breath sounds: Normal breath sounds. Abdominal:      General: Bowel sounds are normal.      Palpations: Abdomen is soft. Musculoskeletal: Normal range of motion. Skin:     General: Skin is warm and dry. Neurological:      Mental Status: He is alert and oriented to person, place, and time. Psychiatric:         Behavior: Behavior normal.            Labs :    Lab Results   Component Value Date    WBC 11.9 02/22/2020    HGB 17.1 02/22/2020    HCT 52.2 02/22/2020     02/22/2020    CHOL 188 02/22/2020    TRIG 179 02/22/2020    HDL 49 02/22/2020    ALT 19 02/22/2020    AST 18 02/22/2020     02/22/2020    K 4.6 02/22/2020     02/22/2020    CREATININE 0.96 02/22/2020    BUN 17 02/22/2020    CO2 18 02/22/2020     No results found for: VOL, APPEARANCE, COLORU, LABSPEC, LABPH, LEUKBLD, NITRU, GLUCOSEU, KETUA, UROBILINOGEN, KETUA, UROBILINOGEN, BILIRUBINUR, OCBU  No results found for: PSA, CEA, , LK0014,       Controlled Substances Monitoring:                                    ASSESSMENT     Patient Active Problem List    Diagnosis Date Noted    Nicotine dependence, cigarettes, with unspecified nicotine-induced disorders     Mixed hyperlipidemia 03/16/2018    Other male erectile dysfunction 03/16/2018        Diagnosis:     ICD-10-CM    1. Mixed hyperlipidemia E78.2 CBC WITH AUTO DIFFERENTIAL     Lipid Panel   2. Essential hypertension I10 CBC WITH AUTO DIFFERENTIAL     Comprehensive Metabolic Panel     Lipid Panel   3. Cigarette nicotine dependence without complication S78.048 CBC WITH AUTO DIFFERENTIAL   4.  Erectile dysfunction, unspecified erectile

## 2020-02-28 NOTE — PATIENT INSTRUCTIONS
Take the medication as prescribed  Low-sodium low-fat diet  Regular exercises  Quit smoking program  Return to clinic earlier if any problems

## 2020-05-09 LAB
ALBUMIN SERPL-MCNC: 4.5 G/DL
ALP BLD-CCNC: 87 U/L
ALT SERPL-CCNC: 16 U/L
ANION GAP SERPL CALCULATED.3IONS-SCNC: 2.1 MMOL/L
AST SERPL-CCNC: 17 U/L
BASOPHILS ABSOLUTE: 0.1 /ΜL
BASOPHILS RELATIVE PERCENT: 1 %
BILIRUB SERPL-MCNC: 0.4 MG/DL (ref 0.1–1.4)
BUN BLDV-MCNC: 16 MG/DL
CALCIUM SERPL-MCNC: 9.3 MG/DL
CHLORIDE BLD-SCNC: 104 MMOL/L
CHOLESTEROL, TOTAL: 166 MG/DL
CHOLESTEROL/HDL RATIO: NORMAL
CO2: 20 MMOL/L
CREAT SERPL-MCNC: 0.83 MG/DL
EOSINOPHILS ABSOLUTE: 0.5 /ΜL
EOSINOPHILS RELATIVE PERCENT: 5 %
GFR CALCULATED: 95
GLUCOSE BLD-MCNC: 99 MG/DL
HCT VFR BLD CALC: 50.7 % (ref 41–53)
HDLC SERPL-MCNC: 45 MG/DL (ref 35–70)
HEMOGLOBIN: 16.2 G/DL (ref 13.5–17.5)
LDL CHOLESTEROL CALCULATED: 77 MG/DL (ref 0–160)
LYMPHOCYTES ABSOLUTE: 2.8 /ΜL
LYMPHOCYTES RELATIVE PERCENT: 28 %
MCH RBC QN AUTO: 29.3 PG
MCHC RBC AUTO-ENTMCNC: 32 G/DL
MCV RBC AUTO: 92 FL
MONOCYTES ABSOLUTE: 0.8 /ΜL
MONOCYTES RELATIVE PERCENT: 8 %
NEUTROPHILS ABSOLUTE: 5.8 /ΜL
NEUTROPHILS RELATIVE PERCENT: 57 %
PDW BLD-RTO: 12.6 %
PLATELET # BLD: 239 K/ΜL
PMV BLD AUTO: NORMAL FL
POTASSIUM SERPL-SCNC: 4.1 MMOL/L
RBC # BLD: 5.53 10^6/ΜL
SODIUM BLD-SCNC: 140 MMOL/L
TOTAL PROTEIN: 6.6
TRIGL SERPL-MCNC: 222 MG/DL
VLDLC SERPL CALC-MCNC: 44 MG/DL
WBC # BLD: 10.1 10^3/ML

## 2020-05-29 ENCOUNTER — OFFICE VISIT (OUTPATIENT)
Dept: FAMILY MEDICINE CLINIC | Age: 60
End: 2020-05-29
Payer: COMMERCIAL

## 2020-05-29 VITALS
TEMPERATURE: 98.4 F | OXYGEN SATURATION: 98 % | DIASTOLIC BLOOD PRESSURE: 88 MMHG | BODY MASS INDEX: 29.24 KG/M2 | WEIGHT: 198 LBS | HEART RATE: 85 BPM | SYSTOLIC BLOOD PRESSURE: 136 MMHG

## 2020-05-29 PROCEDURE — G8926 SPIRO NO PERF OR DOC: HCPCS | Performed by: FAMILY MEDICINE

## 2020-05-29 PROCEDURE — G8417 CALC BMI ABV UP PARAM F/U: HCPCS | Performed by: FAMILY MEDICINE

## 2020-05-29 PROCEDURE — 99214 OFFICE O/P EST MOD 30 MIN: CPT | Performed by: FAMILY MEDICINE

## 2020-05-29 PROCEDURE — G8427 DOCREV CUR MEDS BY ELIG CLIN: HCPCS | Performed by: FAMILY MEDICINE

## 2020-05-29 PROCEDURE — 4004F PT TOBACCO SCREEN RCVD TLK: CPT | Performed by: FAMILY MEDICINE

## 2020-05-29 PROCEDURE — 3023F SPIROM DOC REV: CPT | Performed by: FAMILY MEDICINE

## 2020-05-29 PROCEDURE — 3017F COLORECTAL CA SCREEN DOC REV: CPT | Performed by: FAMILY MEDICINE

## 2020-05-29 RX ORDER — TRIAMCINOLONE ACETONIDE 1 MG/G
CREAM TOPICAL 2 TIMES DAILY
COMMUNITY
End: 2020-07-10 | Stop reason: SDUPTHER

## 2020-05-29 ASSESSMENT — ENCOUNTER SYMPTOMS
SHORTNESS OF BREATH: 1
ALLERGIC/IMMUNOLOGIC NEGATIVE: 1
EYES NEGATIVE: 1
GASTROINTESTINAL NEGATIVE: 1
COUGH: 1

## 2020-05-29 NOTE — PROGRESS NOTES
Cigarette nicotine dependence without complication X73.802 Jewell Whittaker MD, Pulmonary, [facility] ([facility])   4. Erectile dysfunction, unspecified erectile dysfunction type N52.9    5. Chronic obstructive pulmonary disease, unspecified COPD type ([facility] J44.9 Jewell Whittakre MD, Pulmonary, [facility] ([facility])   6. Dermatitis L30.9        PLAN:   Continue present treatment  Low-sodium low-fat low-carb diet  Regular exercises  Pulmonary referral  Quit smoking program  Repeat the lab work before the next visit  Return to clinic earlier if any problem  CDC guidelines for coronavirus given to the patient        Patient Instructions   Continue present treatment  Strict low-sodium low-fat low-carb diet  Regular exercises  Pulmonary referral  Quit smoking program  Regular exercises  Return to clinic earlier if any problems  General Recommendations for Routine Cleaning and Disinfection of Households  Community members can practice routine cleaning of frequently touched surfaces (for example: tables, doorknobs, light switches, handles, desks, toilets, faucets, sinks) with household  and EPA-registered disinfectants that are appropriate for the surface, following label instructions. Labels contain instructions for safe and effective use of the cleaning product including precautions you should take when applying the product, such as wearing gloves and making sure you have good ventilation during use of the product. These guidelines are focused on household settings and are meant for the general public.  Cleaning refers to the removal of germs, dirt, and impurities from surfaces. Cleaning does not kill germs, but by removing them, it lowers their numbers and the risk of spreading infection.  Disinfecting refers to using chemicals to kill germs on surfaces.  This process does not necessarily clean dirty surfaces or remove germs, but by killing germs on a surface after cleaning, it can further lower the risk of spreading infection. General Recommendations for Cleaning and Disinfection of Households with People Isolated in [address] - Confirmed or suspected [facility] members should educate themselves about COVID-19 symptoms and preventing the spread of COVID-19 in homes.  Clean and disinfect high-touch surfaces daily in household common areas (e.g. tables, hard-backed chairs, doorknobs, light switches, remotes, handles, desks, toilets, sinks)   o In the bedroom/bathroom dedicated for an ill person: consider reducing cleaning frequency to as-needed (e.g., soiled items and surfaces) to avoid unnecessary contact with the ill person. - As much as possible, an ill person should stay in a specific room and away from other people in their home. - The caregiver can provide personal cleaning supplies for an ill person's room and bathroom, unless the room is occupied by child or another person for whom such supplies would not be appropriate. These supplies include tissues, paper towels,  and EPA-registered disinfectants (see list link at [employer] website). - If a separate bathroom is not available, the bathroom should be cleaned and disinfected after each use by an ill person. If this is not possible, the caregiver should wait as long as practical after use by an ill person to clean and disinfect the high-touch surfaces. How to clean and disinfect:  Hard Surfaces   Wear disposable gloves when cleaning and disinfecting surfaces. Gloves should be discarded after each cleaning. If reusable gloves are used, those gloves should be dedicated for cleaning and disinfection of surfaces for COVID-19 and should not be used for other purposes. Consult the 's instructions for cleaning and disinfection products used. Clean hands immediately after gloves are removed.  If surfaces are dirty, they should be cleaned using a detergent or soap and water prior to disinfection.    For disinfection, diluted household bleach

## 2020-05-29 NOTE — PATIENT INSTRUCTIONS
per quart of water  o Products with EPA-approved emerging viral pathogens claimsf iconexternal icon are expected to be effective against COVID-19 based on data for harder to kill viruses. Follow the 's instructions for all cleaning and disinfection products (e.g., concentration, application method and contact time, etc.). Soft (porous) surfaces such as carpeted floor, rugs, and drapes  Remove visible contamination if present and clean with appropriate  indicated for use on these surfaces. After cleaning: Launder items as appropriate in accordance with the 's instructions. If possible, launder items using the warmest appropriate water setting for the items and dry items completely, or    Clothing, towels, linens and other items that go in the laundry   Wear disposable gloves when handling dirty laundry from an ill person and then discard after each use. If using reusable gloves, those gloves should be dedicated for cleaning and disinfection of surfaces for COVID-19 and should not be used for other household purposes. Clean hands immediately after gloves are removed. o If no gloves are used when handling dirty laundry, be sure to wash hands afterwards. o If possible, do not shake dirty laundry. This will minimize the possibility of dispersing virus through the air.  o Launder items as appropriate in accordance with the 's instructions. If possible, launder items using the warmest appropriate water setting for the items and dry items completely. Dirty laundry from an ill person can be washed with other people's items. o Clean and disinfect clothes hampers according to guidance above for surfaces. If possible, consider placing a  that is either disposable (can be thrown away) or can be laundered.     [facility] has a list of EPA approved cleaning products on their website - [email or web address] Web. html  [email or web address]-Coronavirus-Fighting-Products-List.[email or web address]

## 2020-06-09 ENCOUNTER — TELEPHONE (OUTPATIENT)
Dept: FAMILY MEDICINE CLINIC | Age: 60
End: 2020-06-09

## 2020-06-10 ENCOUNTER — TELEPHONE (OUTPATIENT)
Dept: FAMILY MEDICINE CLINIC | Age: 60
End: 2020-06-10

## 2020-06-27 LAB
ALBUMIN SERPL-MCNC: 4.9 G/DL
ALP BLD-CCNC: 81 U/L
ALT SERPL-CCNC: 22 U/L
ANION GAP SERPL CALCULATED.3IONS-SCNC: 2.7 MMOL/L
AST SERPL-CCNC: 20 U/L
BILIRUB SERPL-MCNC: 0.4 MG/DL (ref 0.1–1.4)
BUN BLDV-MCNC: 12 MG/DL
CALCIUM SERPL-MCNC: 9.3 MG/DL
CHLORIDE BLD-SCNC: 106 MMOL/L
CHOLESTEROL, TOTAL: 159 MG/DL
CHOLESTEROL/HDL RATIO: NORMAL
CO2: 19 MMOL/L
CREAT SERPL-MCNC: 0.78 MG/DL
GFR CALCULATED: 97
GLUCOSE BLD-MCNC: 98 MG/DL
HDLC SERPL-MCNC: 48 MG/DL (ref 35–70)
LDL CHOLESTEROL CALCULATED: 76 MG/DL (ref 0–160)
POTASSIUM SERPL-SCNC: 4.2 MMOL/L
SODIUM BLD-SCNC: 142 MMOL/L
TOTAL PROTEIN: 6.7
TRIGL SERPL-MCNC: 174 MG/DL
VLDLC SERPL CALC-MCNC: 35 MG/DL

## 2020-07-10 ENCOUNTER — OFFICE VISIT (OUTPATIENT)
Dept: FAMILY MEDICINE CLINIC | Age: 60
End: 2020-07-10
Payer: COMMERCIAL

## 2020-07-10 VITALS
BODY MASS INDEX: 29.18 KG/M2 | SYSTOLIC BLOOD PRESSURE: 138 MMHG | OXYGEN SATURATION: 97 % | DIASTOLIC BLOOD PRESSURE: 86 MMHG | WEIGHT: 197 LBS | HEART RATE: 95 BPM | TEMPERATURE: 97.6 F | HEIGHT: 69 IN

## 2020-07-10 PROCEDURE — 4004F PT TOBACCO SCREEN RCVD TLK: CPT | Performed by: FAMILY MEDICINE

## 2020-07-10 PROCEDURE — G8926 SPIRO NO PERF OR DOC: HCPCS | Performed by: FAMILY MEDICINE

## 2020-07-10 PROCEDURE — 3017F COLORECTAL CA SCREEN DOC REV: CPT | Performed by: FAMILY MEDICINE

## 2020-07-10 PROCEDURE — 99214 OFFICE O/P EST MOD 30 MIN: CPT | Performed by: FAMILY MEDICINE

## 2020-07-10 PROCEDURE — G8427 DOCREV CUR MEDS BY ELIG CLIN: HCPCS | Performed by: FAMILY MEDICINE

## 2020-07-10 PROCEDURE — G8417 CALC BMI ABV UP PARAM F/U: HCPCS | Performed by: FAMILY MEDICINE

## 2020-07-10 PROCEDURE — 3023F SPIROM DOC REV: CPT | Performed by: FAMILY MEDICINE

## 2020-07-10 RX ORDER — VARDENAFIL HYDROCHLORIDE 20 MG/1
20 TABLET ORAL PRN
Qty: 30 TABLET | Refills: 3 | Status: SHIPPED | OUTPATIENT
Start: 2020-07-10

## 2020-07-10 RX ORDER — FENOFIBRATE 48 MG/1
48 TABLET, COATED ORAL DAILY
Qty: 30 TABLET | Refills: 3 | Status: SHIPPED | OUTPATIENT
Start: 2020-07-10

## 2020-07-10 RX ORDER — TRIAMCINOLONE ACETONIDE 1 MG/G
CREAM TOPICAL 2 TIMES DAILY
Qty: 1 TUBE | Refills: 5 | Status: SHIPPED | OUTPATIENT
Start: 2020-07-10 | End: 2020-08-09

## 2020-07-10 RX ORDER — FLUTICASONE PROPIONATE 50 MCG
2 SPRAY, SUSPENSION (ML) NASAL DAILY
Qty: 1 BOTTLE | Refills: 3 | Status: SHIPPED | OUTPATIENT
Start: 2020-07-10

## 2020-07-10 RX ORDER — ATORVASTATIN CALCIUM 40 MG/1
40 TABLET, FILM COATED ORAL DAILY
Qty: 30 TABLET | Refills: 3 | Status: SHIPPED | OUTPATIENT
Start: 2020-07-10

## 2020-07-10 ASSESSMENT — ENCOUNTER SYMPTOMS
EYES NEGATIVE: 1
GASTROINTESTINAL NEGATIVE: 1
RESPIRATORY NEGATIVE: 1
ALLERGIC/IMMUNOLOGIC NEGATIVE: 1

## 2020-07-10 NOTE — PROGRESS NOTES
OFFICE PROGRESS NOTE      SUBJECTIVE:        Patient ID:   Isai Rodriguez is a 62 y.o. male who presents for   Chief Complaint   Patient presents with    Hyperlipidemia           HPI:   Patient states is feeling okay  Lab work is improved  Patient been followed up with the pulmonologist work-up in progress  Patient still continues to smoke  ED functions are better with the medication  Patient states he is not following the diet religiously          Prior to Visit Medications    Medication Sig Taking? Authorizing Provider   atorvastatin (LIPITOR) 40 MG tablet Take 1 tablet by mouth daily Yes Lesley Neal MD   fenofibrate (TRICOR) 48 MG tablet Take 1 tablet by mouth daily Yes Lesley Neal MD   fluticasone (FLONASE) 50 MCG/ACT nasal spray 2 sprays by Nasal route daily 2 sprays each nostril once daily Yes Lesley Neal MD   PROAIR  (85 Base) MCG/ACT inhaler Inhale 2 puffs into the lungs 4 times daily Yes Lesley Neal MD   vardenafil (LEVITRA) 20 MG tablet Take 1 tablet by mouth as needed for Erectile Dysfunction Yes Lesley Neal MD   triamcinolone (KENALOG) 0.1 % cream Apply topically 2 times daily Apply topically 2 times daily. Yes Lesley Neal MD      Social History     Socioeconomic History    Marital status:      Spouse name: None    Number of children: None    Years of education: None    Highest education level: None   Occupational History    None   Social Needs    Financial resource strain: None    Food insecurity     Worry: None     Inability: None    Transportation needs     Medical: None     Non-medical: None   Tobacco Use    Smoking status: Current Every Day Smoker     Packs/day: 1.00     Years: 30.00     Pack years: 30.00     Start date: 1/1/1970    Smokeless tobacco: Never Used   Substance and Sexual Activity    Alcohol use:  Yes     Alcohol/week: 4.0 standard drinks     Types: 4 Cans of beer per week    Drug Normal rate and regular rhythm. Heart sounds: Normal heart sounds. Pulmonary:      Effort: Pulmonary effort is normal.      Breath sounds: Normal breath sounds. Abdominal:      General: Bowel sounds are normal.      Palpations: Abdomen is soft. Musculoskeletal: Normal range of motion. Skin:     General: Skin is warm and dry. Neurological:      Mental Status: He is alert and oriented to person, place, and time. Psychiatric:         Behavior: Behavior normal.            Labs :    Lab Results   Component Value Date    WBC 10.1 05/08/2020    HGB 16.2 05/08/2020    HCT 50.7 05/08/2020     05/08/2020    CHOL 159 06/26/2020    TRIG 174 06/26/2020    HDL 48 06/26/2020    ALT 22 06/26/2020    AST 20 06/26/2020     06/26/2020    K 4.2 06/26/2020     06/26/2020    CREATININE 0.78 06/26/2020    BUN 12 06/26/2020    CO2 19 06/26/2020     No results found for: VOL, APPEARANCE, COLORU, LABSPEC, LABPH, LEUKBLD, NITRU, GLUCOSEU, KETUA, UROBILINOGEN, KETUA, UROBILINOGEN, BILIRUBINUR, OCBU  No results found for: PSA, CEA, , EF1964,       Controlled Substances Monitoring:                                    ASSESSMENT     Patient Active Problem List    Diagnosis Date Noted    Nicotine dependence, cigarettes, with unspecified nicotine-induced disorders     Mixed hyperlipidemia 03/16/2018    Other male erectile dysfunction 03/16/2018        Diagnosis:     ICD-10-CM    1. Mixed hyperlipidemia E78.2    2. Erectile dysfunction, unspecified erectile dysfunction type N52.9 vardenafil (LEVITRA) 20 MG tablet   3. Essential hypertension I10    4. Cigarette nicotine dependence without complication Z05.358    5.  Chronic obstructive pulmonary disease, unspecified COPD [facility] ([facility] J44.9        PLAN:   Continue present treatment  Quit smoking program  Follow-up with the pulmonologist  Low-sodium low-fat diet  Instructions given  Regular exercises  Return to clinic after seen by the pulmonologist  Lab

## 2020-07-10 NOTE — PATIENT INSTRUCTIONS
Continue present treatment  Quit smoking program  Follow-up with the pulmonologist  Low-sodium low-fat diet instructions  Given  Regular exercises  Return to clinic earlier if any problem

## 2021-02-22 ENCOUNTER — HOSPITAL ENCOUNTER (EMERGENCY)
Age: 61
Discharge: HOME | End: 2021-02-22
Attending: EMERGENCY MEDICINE
Payer: COMMERCIAL

## 2021-02-22 ENCOUNTER — APPOINTMENT (OUTPATIENT)
Dept: ULTRASOUND IMAGING | Age: 61
End: 2021-02-22
Payer: COMMERCIAL

## 2021-02-22 VITALS
HEART RATE: 94 BPM | TEMPERATURE: 97.1 F | BODY MASS INDEX: 29.62 KG/M2 | WEIGHT: 200 LBS | OXYGEN SATURATION: 97 % | DIASTOLIC BLOOD PRESSURE: 67 MMHG | RESPIRATION RATE: 18 BRPM | HEIGHT: 69 IN | SYSTOLIC BLOOD PRESSURE: 156 MMHG

## 2021-02-22 DIAGNOSIS — M79.604 RIGHT LEG PAIN: Primary | ICD-10-CM

## 2021-02-22 LAB
ANION GAP SERPL CALCULATED.3IONS-SCNC: 13 MMOL/L (ref 7–16)
BASOPHILS ABSOLUTE: 0.07 E9/L (ref 0–0.2)
BASOPHILS RELATIVE PERCENT: 0.5 % (ref 0–2)
BUN BLDV-MCNC: 12 MG/DL (ref 8–23)
CALCIUM SERPL-MCNC: 9.6 MG/DL (ref 8.6–10.2)
CHLORIDE BLD-SCNC: 103 MMOL/L (ref 98–107)
CO2: 23 MMOL/L (ref 22–29)
CREAT SERPL-MCNC: 0.7 MG/DL (ref 0.7–1.2)
EOSINOPHILS ABSOLUTE: 0.12 E9/L (ref 0.05–0.5)
EOSINOPHILS RELATIVE PERCENT: 0.9 % (ref 0–6)
GFR AFRICAN AMERICAN: >60
GFR NON-AFRICAN AMERICAN: >60 ML/MIN/1.73
GLUCOSE BLD-MCNC: 105 MG/DL (ref 74–99)
HCT VFR BLD CALC: 48.6 % (ref 37–54)
HEMOGLOBIN: 16.3 G/DL (ref 12.5–16.5)
IMMATURE GRANULOCYTES #: 0.07 E9/L
IMMATURE GRANULOCYTES %: 0.5 % (ref 0–5)
LYMPHOCYTES ABSOLUTE: 2.3 E9/L (ref 1.5–4)
LYMPHOCYTES RELATIVE PERCENT: 16.9 % (ref 20–42)
MCH RBC QN AUTO: 30.8 PG (ref 26–35)
MCHC RBC AUTO-ENTMCNC: 33.5 % (ref 32–34.5)
MCV RBC AUTO: 91.7 FL (ref 80–99.9)
MONOCYTES ABSOLUTE: 1.2 E9/L (ref 0.1–0.95)
MONOCYTES RELATIVE PERCENT: 8.8 % (ref 2–12)
NEUTROPHILS ABSOLUTE: 9.86 E9/L (ref 1.8–7.3)
NEUTROPHILS RELATIVE PERCENT: 72.4 % (ref 43–80)
PDW BLD-RTO: 12.7 FL (ref 11.5–15)
PLATELET # BLD: 223 E9/L (ref 130–450)
PMV BLD AUTO: 9.9 FL (ref 7–12)
POTASSIUM REFLEX MAGNESIUM: 3.9 MMOL/L (ref 3.5–5)
RBC # BLD: 5.3 E12/L (ref 3.8–5.8)
SODIUM BLD-SCNC: 139 MMOL/L (ref 132–146)
TOTAL CK: 158 U/L (ref 20–200)
WBC # BLD: 13.6 E9/L (ref 4.5–11.5)

## 2021-02-22 PROCEDURE — 6360000002 HC RX W HCPCS: Performed by: EMERGENCY MEDICINE

## 2021-02-22 PROCEDURE — 96374 THER/PROPH/DIAG INJ IV PUSH: CPT

## 2021-02-22 PROCEDURE — 2580000003 HC RX 258: Performed by: EMERGENCY MEDICINE

## 2021-02-22 PROCEDURE — 99283 EMERGENCY DEPT VISIT LOW MDM: CPT

## 2021-02-22 PROCEDURE — 80048 BASIC METABOLIC PNL TOTAL CA: CPT

## 2021-02-22 PROCEDURE — 82550 ASSAY OF CK (CPK): CPT

## 2021-02-22 PROCEDURE — 93971 EXTREMITY STUDY: CPT

## 2021-02-22 PROCEDURE — 85025 COMPLETE CBC W/AUTO DIFF WBC: CPT

## 2021-02-22 RX ORDER — MORPHINE SULFATE 10 MG/ML
6 INJECTION, SOLUTION INTRAMUSCULAR; INTRAVENOUS ONCE
Status: COMPLETED | OUTPATIENT
Start: 2021-02-22 | End: 2021-02-22

## 2021-02-22 RX ORDER — 0.9 % SODIUM CHLORIDE 0.9 %
1000 INTRAVENOUS SOLUTION INTRAVENOUS ONCE
Status: COMPLETED | OUTPATIENT
Start: 2021-02-22 | End: 2021-02-22

## 2021-02-22 RX ADMIN — SODIUM CHLORIDE 1000 ML: 9 INJECTION, SOLUTION INTRAVENOUS at 16:22

## 2021-02-22 RX ADMIN — MORPHINE SULFATE 6 MG: 10 INJECTION, SOLUTION INTRAMUSCULAR; INTRAVENOUS at 16:21

## 2021-02-22 ASSESSMENT — ENCOUNTER SYMPTOMS
SHORTNESS OF BREATH: 0
COUGH: 0
ABDOMINAL PAIN: 0
BACK PAIN: 0
COLOR CHANGE: 0

## 2021-02-22 ASSESSMENT — PAIN SCALES - GENERAL
PAINLEVEL_OUTOF10: 10
PAINLEVEL_OUTOF10: 10

## 2021-02-22 NOTE — ED PROVIDER NOTES
Patient presents to the ED with complaint of right lower extremity pain. States the pain has been present for the past couple days. Is as moderate to severe in severity. Pain is worse with movement. Denies any injuries. Patient does report that he had a stent placed in 2006. States he has not had any complications since then. Pain is mostly in the right proximal lower extremity and radiates down. Denies any swelling in his lower extremities. Denies chest pain or shortness of breath. Patient has not taken anything recently for pain control. Review of Systems   Constitutional: Negative for chills, diaphoresis, fatigue and fever. Respiratory: Negative for cough and shortness of breath. Cardiovascular: Negative for chest pain and leg swelling. Gastrointestinal: Negative for abdominal pain. Musculoskeletal: Positive for myalgias. Negative for back pain, gait problem and neck pain. Skin: Negative for color change, pallor and wound. Neurological: Negative for dizziness, weakness, light-headedness and numbness. Hematological: Does not bruise/bleed easily. All other systems reviewed and are negative. Physical Exam  Vitals signs and nursing note reviewed. Constitutional:       General: He is not in acute distress. Appearance: He is well-developed. He is not diaphoretic. Comments: Sitting in chair no distress   HENT:      Head: Normocephalic and atraumatic. Eyes:      Pupils: Pupils are equal, round, and reactive to light. Neck:      Musculoskeletal: Normal range of motion and neck supple. Cardiovascular:      Rate and Rhythm: Regular rhythm. Tachycardia present. Heart sounds: Normal heart sounds. No murmur. Pulmonary:      Effort: Pulmonary effort is normal. No respiratory distress. Breath sounds: Normal breath sounds. Abdominal:      General: Bowel sounds are normal.      Palpations: Abdomen is soft. Tenderness:  There is no abdominal tenderness. Musculoskeletal:      Comments: Patient is able to wiggle toes in both the right and left lower extremity. Patient does have some discomfort with palpation of the right lower extremity. The extremity is warm and has no pallor. It is not cold to touch. Patient has a 1+ dorsalis pedis pulse on the right. Cap refill is brisk. No evidence of arterial occlusion present in the lower extremities. Skin:     General: Skin is warm and dry. Coloration: Skin is not pale. Neurological:      Mental Status: He is alert and oriented to person, place, and time. Sensory: No sensory deficit ( Sensation grossly intact in lower extremities. ). Procedures     MDM   Presents to the ED with a complaint of right lower extremity pain. He was concerned about possibility of having occlusion of his stent. On exam his right lower extremity was not cold to touch it was not pale. He did have a 1+ dorsalis pedis pulse as well as a posterior tibialis pulse. He had adequate brisk cap refill in the right lower extremity. His feet were warm to touch. No evidence to suggest arterial occlusion. I did obtain an ultrasound secondary to his pain which showed no evidence of DVT. Labs were also assessed. BMP showed no evidence of electrolyte malady or renal insufficiency. CBC did reveal a mild leukocytosis but there was no left shift. No bandemia. No anemia. CK total was 158. Patient is comfortably discharged home after being medicated and has no pain. He will follow up with his PCP. ED Course as of Feb 22 1722   Mon Feb 22, 2021   1718 Patient resting in bed no distress. Discussed results of labs and imaging with him. He has no pain at the present time. He is comfortably discharged home and will follow up with his PCP. He understands if he is worsening symptoms or new concerns that he can return to the ED for further evaluation.     [MS]      ED Course User Index  [MS] Rui Sees, DO --------------------------------------------- PAST HISTORY ---------------------------------------------  Past Medical History:  has a past medical history of Hyperlipidemia. Past Surgical History:  has a past surgical history that includes Coronary angioplasty with stent and Thyroid surgery. Social History:  reports that he has been smoking. He started smoking about 51 years ago. He has a 30.00 pack-year smoking history. He has never used smokeless tobacco. He reports current alcohol use of about 4.0 standard drinks of alcohol per week. He reports that he does not use drugs. Family History: family history includes Cancer in his sister; Other in his mother. The patients home medications have been reviewed.     Allergies: Sulfa antibiotics    -------------------------------------------------- RESULTS -------------------------------------------------  Labs:  Results for orders placed or performed during the hospital encounter of 75/08/24   Basic Metabolic Panel w/ Reflex to MG   Result Value Ref Range    CO2 23 22 - 29 mmol/L    Glucose 105 (H) 74 - 99 mg/dL    BUN 12 8 - 23 mg/dL    CREATININE 0.7 0.7 - 1.2 mg/dL    GFR Non-African American >60 >=60 mL/min/1.73    GFR African American >60     Calcium 9.6 8.6 - 10.2 mg/dL   CBC Auto Differential   Result Value Ref Range    WBC 13.6 (H) 4.5 - 11.5 E9/L    RBC 5.30 3.80 - 5.80 E12/L    Hemoglobin 16.3 12.5 - 16.5 g/dL    Hematocrit 48.6 37.0 - 54.0 %    MCV 91.7 80.0 - 99.9 fL    MCH 30.8 26.0 - 35.0 pg    MCHC 33.5 32.0 - 34.5 %    RDW 12.7 11.5 - 15.0 fL    Platelets 039 079 - 054 E9/L    MPV 9.9 7.0 - 12.0 fL    Neutrophils % 72.4 43.0 - 80.0 %    Immature Granulocytes % 0.5 0.0 - 5.0 %    Lymphocytes % 16.9 (L) 20.0 - 42.0 %    Monocytes % 8.8 2.0 - 12.0 %    Eosinophils % 0.9 0.0 - 6.0 %    Basophils % 0.5 0.0 - 2.0 %    Neutrophils Absolute 9.86 (H) 1.80 - 7.30 E9/L    Immature Granulocytes # 0.07 E9/L    Lymphocytes Absolute 2.30 1.50 - 4.00 E9/L    Monocytes Absolute 1.20 (H) 0.10 - 0.95 E9/L    Eosinophils Absolute 0.12 0.05 - 0.50 E9/L    Basophils Absolute 0.07 0.00 - 0.20 E9/L   CK   Result Value Ref Range    Total  20 - 200 U/L       Radiology:  US DUP LOWER EXTREMITY RIGHT JOSE R   Final Result   No evidence of DVT in the right lower extremity.          ------------------------- NURSING NOTES AND VITALS REVIEWED ---------------------------  Date / Time Roomed:  2/22/2021  3:19 PM  ED Bed Assignment:  23/23    The nursing notes within the ED encounter and vital signs as below have been reviewed. Pulse 105   Temp 97.1 °F (36.2 °C) (Oral)   Resp 18   Ht 5' 9\" (1.753 m)   Wt 200 lb (90.7 kg)   SpO2 96%   BMI 29.53 kg/m²   Oxygen Saturation Interpretation: Normal      ------------------------------------------ PROGRESS NOTES ------------------------------------------  I have spoken with the patient and discussed todays results, in addition to providing specific details for the plan of care and counseling regarding the diagnosis and prognosis. Their questions are answered at this time and they are agreeable with the plan. I discussed at length with them reasons for immediate return here for re evaluation. They will followup with primary care by calling their office tomorrow. --------------------------------- ADDITIONAL PROVIDER NOTES ---------------------------------  At this time the patient is without objective evidence of an acute process requiring hospitalization or inpatient management. They have remained hemodynamically stable throughout their entire ED visit and are stable for discharge with outpatient follow-up. The plan has been discussed in detail and they are aware of the specific conditions for emergent return, as well as the importance of follow-up. New Prescriptions    No medications on file       Diagnosis:  1.  Right leg pain        Disposition:  Patient's disposition: Discharge to home  Patient's condition is stable.            Obed Michaels,   02/22/21 1722

## 2024-11-14 ENCOUNTER — APPOINTMENT (OUTPATIENT)
Age: 64
DRG: 321 | End: 2024-11-14
Attending: INTERNAL MEDICINE
Payer: COMMERCIAL

## 2024-11-14 ENCOUNTER — APPOINTMENT (OUTPATIENT)
Dept: GENERAL RADIOLOGY | Age: 64
DRG: 321 | End: 2024-11-14
Payer: COMMERCIAL

## 2024-11-14 ENCOUNTER — HOSPITAL ENCOUNTER (INPATIENT)
Age: 64
LOS: 2 days | Discharge: HOME | DRG: 321 | End: 2024-11-16
Attending: EMERGENCY MEDICINE | Admitting: INTERNAL MEDICINE
Payer: COMMERCIAL

## 2024-11-14 ENCOUNTER — APPOINTMENT (OUTPATIENT)
Dept: CT IMAGING | Age: 64
DRG: 321 | End: 2024-11-14
Payer: COMMERCIAL

## 2024-11-14 DIAGNOSIS — I21.4 NSTEMI (NON-ST ELEVATED MYOCARDIAL INFARCTION) (HCC): Primary | ICD-10-CM

## 2024-11-14 DIAGNOSIS — R06.02 SHORTNESS OF BREATH: ICD-10-CM

## 2024-11-14 DIAGNOSIS — I24.9 ACUTE CORONARY SYNDROME (HCC): ICD-10-CM

## 2024-11-14 DIAGNOSIS — R07.9 ACUTE CHEST PAIN: ICD-10-CM

## 2024-11-14 DIAGNOSIS — R79.89 ELEVATED D-DIMER: ICD-10-CM

## 2024-11-14 DIAGNOSIS — I25.10 CAD (CORONARY ARTERY DISEASE): ICD-10-CM

## 2024-11-14 LAB
ALBUMIN SERPL-MCNC: 3.4 G/DL (ref 3.5–5.2)
ALP SERPL-CCNC: 119 U/L (ref 40–129)
ALT SERPL-CCNC: 31 U/L (ref 0–40)
ANION GAP SERPL CALCULATED.3IONS-SCNC: 15 MMOL/L (ref 7–16)
AST SERPL-CCNC: 35 U/L (ref 0–39)
BASOPHILS # BLD: 0.06 K/UL (ref 0–0.2)
BASOPHILS NFR BLD: 0 % (ref 0–2)
BILIRUB SERPL-MCNC: 0.3 MG/DL (ref 0–1.2)
BNP SERPL-MCNC: 5068 PG/ML (ref 0–450)
BUN SERPL-MCNC: 20 MG/DL (ref 6–23)
CALCIUM SERPL-MCNC: 8.8 MG/DL (ref 8.6–10.2)
CHLORIDE SERPL-SCNC: 107 MMOL/L (ref 98–107)
CO2 SERPL-SCNC: 21 MMOL/L (ref 22–29)
CREAT SERPL-MCNC: 0.9 MG/DL (ref 0.7–1.2)
D-DIMER QUANTITATIVE: 1938 NG/ML DDU (ref 0–230)
ECHO AO ASC DIAM: 2.2 CM
ECHO AO ASCENDING AORTA INDEX: 1.08 CM/M2
ECHO AV AREA PEAK VELOCITY: 2.6 CM2
ECHO AV AREA VTI: 2.7 CM2
ECHO AV AREA/BSA PEAK VELOCITY: 1.3 CM2/M2
ECHO AV AREA/BSA VTI: 1.3 CM2/M2
ECHO AV CUSP MM: 2 CM
ECHO AV MEAN GRADIENT: 5 MMHG
ECHO AV MEAN VELOCITY: 1.1 M/S
ECHO AV PEAK GRADIENT: 10 MMHG
ECHO AV PEAK VELOCITY: 1.6 M/S
ECHO AV VELOCITY RATIO: 0.75
ECHO AV VTI: 25.9 CM
ECHO EST RA PRESSURE: 15 MMHG
ECHO LA DIAMETER INDEX: 2.06 CM/M2
ECHO LA DIAMETER: 4.2 CM
ECHO LA VOL A-L A2C: 85 ML (ref 18–58)
ECHO LA VOL A-L A4C: 92 ML (ref 18–58)
ECHO LA VOL BP: 85 ML (ref 18–58)
ECHO LA VOL MOD A2C: 79 ML (ref 18–58)
ECHO LA VOL MOD A4C: 79 ML (ref 18–58)
ECHO LA VOL/BSA BIPLANE: 42 ML/M2 (ref 16–34)
ECHO LA VOLUME AREA LENGTH: 96 ML
ECHO LA VOLUME INDEX A-L A2C: 42 ML/M2 (ref 16–34)
ECHO LA VOLUME INDEX A-L A4C: 45 ML/M2 (ref 16–34)
ECHO LA VOLUME INDEX AREA LENGTH: 47 ML/M2 (ref 16–34)
ECHO LA VOLUME INDEX MOD A2C: 39 ML/M2 (ref 16–34)
ECHO LA VOLUME INDEX MOD A4C: 39 ML/M2 (ref 16–34)
ECHO LV EDV A2C: 223 ML
ECHO LV EDV A4C: 223 ML
ECHO LV EDV BP: 225 ML (ref 67–155)
ECHO LV EDV INDEX A4C: 109 ML/M2
ECHO LV EDV INDEX BP: 110 ML/M2
ECHO LV EDV NDEX A2C: 109 ML/M2
ECHO LV EJECTION FRACTION A2C: 46 %
ECHO LV EJECTION FRACTION A4C: 27 %
ECHO LV EJECTION FRACTION BIPLANE: 37 % (ref 55–100)
ECHO LV ESV A2C: 120 ML
ECHO LV ESV A4C: 162 ML
ECHO LV ESV BP: 141 ML (ref 22–58)
ECHO LV ESV INDEX A2C: 59 ML/M2
ECHO LV ESV INDEX A4C: 79 ML/M2
ECHO LV ESV INDEX BP: 69 ML/M2
ECHO LV FRACTIONAL SHORTENING: 16 % (ref 28–44)
ECHO LV INTERNAL DIMENSION DIASTOLE INDEX: 2.79 CM/M2
ECHO LV INTERNAL DIMENSION DIASTOLIC: 5.7 CM (ref 4.2–5.9)
ECHO LV INTERNAL DIMENSION SYSTOLIC INDEX: 2.35 CM/M2
ECHO LV INTERNAL DIMENSION SYSTOLIC: 4.8 CM
ECHO LV IVSD: 1 CM (ref 0.6–1)
ECHO LV MASS 2D: 241.3 G (ref 88–224)
ECHO LV MASS INDEX 2D: 118.3 G/M2 (ref 49–115)
ECHO LV POSTERIOR WALL DIASTOLIC: 1.1 CM (ref 0.6–1)
ECHO LV RELATIVE WALL THICKNESS RATIO: 0.39
ECHO LVOT AREA: 3.5 CM2
ECHO LVOT AV VTI INDEX: 0.75
ECHO LVOT DIAM: 2.1 CM
ECHO LVOT MEAN GRADIENT: 3 MMHG
ECHO LVOT PEAK GRADIENT: 6 MMHG
ECHO LVOT PEAK VELOCITY: 1.2 M/S
ECHO LVOT STROKE VOLUME INDEX: 33.1 ML/M2
ECHO LVOT SV: 67.5 ML
ECHO LVOT VTI: 19.5 CM
ECHO MV A VELOCITY: 1.07 M/S
ECHO MV AREA PHT: 4.7 CM2
ECHO MV AREA VTI: 2.1 CM2
ECHO MV E DECELERATION TIME (DT): 164.6 MS
ECHO MV E VELOCITY: 0.95 M/S
ECHO MV E/A RATIO: 0.89
ECHO MV EROA PISA: 0.3 CM2
ECHO MV LVOT VTI INDEX: 1.62
ECHO MV MAX VELOCITY: 1.1 M/S
ECHO MV MEAN GRADIENT: 2 MMHG
ECHO MV MEAN VELOCITY: 0.7 M/S
ECHO MV PEAK GRADIENT: 5 MMHG
ECHO MV PRESSURE HALF TIME (PHT): 46.5 MS
ECHO MV REGURGITANT ALIASING (NYQUIST) VELOCITY: 36 CM/S
ECHO MV REGURGITANT RADIUS PISA: 0.69 CM
ECHO MV REGURGITANT VELOCITY PISA: 3.7 M/S
ECHO MV REGURGITANT VOLUME PISA: 35.35 ML
ECHO MV REGURGITANT VTIA: 121.5 CM
ECHO MV VTI: 31.5 CM
ECHO PV MAX VELOCITY: 1 M/S
ECHO PV MEAN GRADIENT: 2 MMHG
ECHO PV MEAN VELOCITY: 0.6 M/S
ECHO PV PEAK GRADIENT: 4 MMHG
ECHO PV VTI: 16.4 CM
ECHO PVEIN A DURATION: 118 MS
ECHO PVEIN A VELOCITY: 0.2 M/S
ECHO PVEIN PEAK D VELOCITY: 0.4 M/S
ECHO PVEIN PEAK S VELOCITY: 0.7 M/S
ECHO PVEIN S/D RATIO: 1.8
ECHO RIGHT VENTRICULAR SYSTOLIC PRESSURE (RVSP): 36 MMHG
ECHO RV INTERNAL DIMENSION: 3 CM
ECHO RV LONGITUDINAL DIMENSION: 8.6 CM
ECHO RV MID DIMENSION: 3 CM
ECHO TV REGURGITANT MAX VELOCITY: 2.28 M/S
ECHO TV REGURGITANT PEAK GRADIENT: 21 MMHG
EKG ATRIAL RATE: 109 BPM
EKG ATRIAL RATE: 90 BPM
EKG P AXIS: 70 DEGREES
EKG P AXIS: 81 DEGREES
EKG P-R INTERVAL: 126 MS
EKG P-R INTERVAL: 128 MS
EKG Q-T INTERVAL: 390 MS
EKG Q-T INTERVAL: 392 MS
EKG QRS DURATION: 152 MS
EKG QRS DURATION: 156 MS
EKG QTC CALCULATION (BAZETT): 479 MS
EKG QTC CALCULATION (BAZETT): 525 MS
EKG R AXIS: 128 DEGREES
EKG R AXIS: 89 DEGREES
EKG T AXIS: -42 DEGREES
EKG T AXIS: -7 DEGREES
EKG VENTRICULAR RATE: 109 BPM
EKG VENTRICULAR RATE: 90 BPM
EOSINOPHIL # BLD: 0.16 K/UL (ref 0.05–0.5)
EOSINOPHILS RELATIVE PERCENT: 1 % (ref 0–6)
ERYTHROCYTE [DISTWIDTH] IN BLOOD BY AUTOMATED COUNT: 13.2 % (ref 11.5–15)
FLUAV RNA RESP QL NAA+PROBE: NOT DETECTED
FLUBV RNA RESP QL NAA+PROBE: NOT DETECTED
GFR, ESTIMATED: >90 ML/MIN/1.73M2
GLUCOSE SERPL-MCNC: 119 MG/DL (ref 74–99)
HCT VFR BLD AUTO: 40.8 % (ref 37–54)
HGB BLD-MCNC: 13.6 G/DL (ref 12.5–16.5)
IMM GRANULOCYTES # BLD AUTO: 0.07 K/UL (ref 0–0.58)
IMM GRANULOCYTES NFR BLD: 1 % (ref 0–5)
LACTATE BLDV-SCNC: 1.9 MMOL/L (ref 0.5–2.2)
LYMPHOCYTES NFR BLD: 1.96 K/UL (ref 1.5–4)
LYMPHOCYTES RELATIVE PERCENT: 15 % (ref 20–42)
MAGNESIUM SERPL-MCNC: 2 MG/DL (ref 1.6–2.6)
MCH RBC QN AUTO: 31.9 PG (ref 26–35)
MCHC RBC AUTO-ENTMCNC: 33.3 G/DL (ref 32–34.5)
MCV RBC AUTO: 95.6 FL (ref 80–99.9)
MONOCYTES NFR BLD: 1.36 K/UL (ref 0.1–0.95)
MONOCYTES NFR BLD: 10 % (ref 2–12)
NEUTROPHILS NFR BLD: 73 % (ref 43–80)
NEUTS SEG NFR BLD: 9.77 K/UL (ref 1.8–7.3)
PARTIAL THROMBOPLASTIN TIME: 34.9 SEC (ref 24.5–35.1)
PARTIAL THROMBOPLASTIN TIME: 57.6 SEC (ref 24.5–35.1)
PARTIAL THROMBOPLASTIN TIME: 63.3 SEC (ref 24.5–35.1)
PARTIAL THROMBOPLASTIN TIME: 93.3 SEC (ref 24.5–35.1)
PLATELET # BLD AUTO: 204 K/UL (ref 130–450)
PMV BLD AUTO: 10.8 FL (ref 7–12)
POTASSIUM SERPL-SCNC: 3.7 MMOL/L (ref 3.5–5)
PROT SERPL-MCNC: 6.3 G/DL (ref 6.4–8.3)
PSA SERPL-MCNC: 0.02 NG/ML (ref 0–4)
RBC # BLD AUTO: 4.27 M/UL (ref 3.8–5.8)
SARS-COV-2 RNA RESP QL NAA+PROBE: NOT DETECTED
SODIUM SERPL-SCNC: 143 MMOL/L (ref 132–146)
SOURCE: NORMAL
SPECIMEN DESCRIPTION: NORMAL
TROPONIN I SERPL HS-MCNC: 876 NG/L (ref 0–11)
TROPONIN I SERPL HS-MCNC: 963 NG/L (ref 0–11)
WBC OTHER # BLD: 13.4 K/UL (ref 4.5–11.5)

## 2024-11-14 PROCEDURE — 6360000002 HC RX W HCPCS: Performed by: EMERGENCY MEDICINE

## 2024-11-14 PROCEDURE — 85379 FIBRIN DEGRADATION QUANT: CPT

## 2024-11-14 PROCEDURE — 83880 ASSAY OF NATRIURETIC PEPTIDE: CPT

## 2024-11-14 PROCEDURE — 85025 COMPLETE CBC W/AUTO DIFF WBC: CPT

## 2024-11-14 PROCEDURE — 36415 COLL VENOUS BLD VENIPUNCTURE: CPT

## 2024-11-14 PROCEDURE — 96374 THER/PROPH/DIAG INJ IV PUSH: CPT

## 2024-11-14 PROCEDURE — 6360000004 HC RX CONTRAST MEDICATION: Performed by: RADIOLOGY

## 2024-11-14 PROCEDURE — 6370000000 HC RX 637 (ALT 250 FOR IP): Performed by: INTERNAL MEDICINE

## 2024-11-14 PROCEDURE — 99291 CRITICAL CARE FIRST HOUR: CPT

## 2024-11-14 PROCEDURE — 87636 SARSCOV2 & INF A&B AMP PRB: CPT

## 2024-11-14 PROCEDURE — 93306 TTE W/DOPPLER COMPLETE: CPT

## 2024-11-14 PROCEDURE — 80053 COMPREHEN METABOLIC PANEL: CPT

## 2024-11-14 PROCEDURE — 6360000002 HC RX W HCPCS: Performed by: INTERNAL MEDICINE

## 2024-11-14 PROCEDURE — 93010 ELECTROCARDIOGRAM REPORT: CPT | Performed by: INTERNAL MEDICINE

## 2024-11-14 PROCEDURE — 71045 X-RAY EXAM CHEST 1 VIEW: CPT

## 2024-11-14 PROCEDURE — G0103 PSA SCREENING: HCPCS

## 2024-11-14 PROCEDURE — 83605 ASSAY OF LACTIC ACID: CPT

## 2024-11-14 PROCEDURE — 6370000000 HC RX 637 (ALT 250 FOR IP): Performed by: EMERGENCY MEDICINE

## 2024-11-14 PROCEDURE — 83735 ASSAY OF MAGNESIUM: CPT

## 2024-11-14 PROCEDURE — 6370000000 HC RX 637 (ALT 250 FOR IP)

## 2024-11-14 PROCEDURE — 93005 ELECTROCARDIOGRAM TRACING: CPT | Performed by: EMERGENCY MEDICINE

## 2024-11-14 PROCEDURE — 96375 TX/PRO/DX INJ NEW DRUG ADDON: CPT

## 2024-11-14 PROCEDURE — 85730 THROMBOPLASTIN TIME PARTIAL: CPT

## 2024-11-14 PROCEDURE — 2060000000 HC ICU INTERMEDIATE R&B

## 2024-11-14 PROCEDURE — 96365 THER/PROPH/DIAG IV INF INIT: CPT

## 2024-11-14 PROCEDURE — 84484 ASSAY OF TROPONIN QUANT: CPT

## 2024-11-14 PROCEDURE — 71275 CT ANGIOGRAPHY CHEST: CPT

## 2024-11-14 RX ORDER — ASPIRIN 81 MG/1
81 TABLET, CHEWABLE ORAL DAILY
Status: DISCONTINUED | OUTPATIENT
Start: 2024-11-14 | End: 2024-11-14

## 2024-11-14 RX ORDER — IPRATROPIUM BROMIDE AND ALBUTEROL SULFATE 2.5; .5 MG/3ML; MG/3ML
2 SOLUTION RESPIRATORY (INHALATION) ONCE
Status: COMPLETED | OUTPATIENT
Start: 2024-11-14 | End: 2024-11-14

## 2024-11-14 RX ORDER — ACETAMINOPHEN 325 MG/1
650 TABLET ORAL ONCE
Status: COMPLETED | OUTPATIENT
Start: 2024-11-14 | End: 2024-11-14

## 2024-11-14 RX ORDER — HEPARIN SODIUM 1000 [USP'U]/ML
4000 INJECTION, SOLUTION INTRAVENOUS; SUBCUTANEOUS PRN
Status: DISCONTINUED | OUTPATIENT
Start: 2024-11-14 | End: 2024-11-15

## 2024-11-14 RX ORDER — CLOPIDOGREL BISULFATE 75 MG/1
300 TABLET ORAL ONCE
Status: COMPLETED | OUTPATIENT
Start: 2024-11-14 | End: 2024-11-14

## 2024-11-14 RX ORDER — ATORVASTATIN CALCIUM 40 MG/1
40 TABLET, FILM COATED ORAL DAILY
Status: DISCONTINUED | OUTPATIENT
Start: 2024-11-14 | End: 2024-11-16 | Stop reason: HOSPADM

## 2024-11-14 RX ORDER — HEPARIN SODIUM 1000 [USP'U]/ML
4000 INJECTION, SOLUTION INTRAVENOUS; SUBCUTANEOUS ONCE
Status: COMPLETED | OUTPATIENT
Start: 2024-11-14 | End: 2024-11-14

## 2024-11-14 RX ORDER — FENOFIBRATE 54 MG/1
54 TABLET ORAL DAILY
Status: DISCONTINUED | OUTPATIENT
Start: 2024-11-14 | End: 2024-11-14

## 2024-11-14 RX ORDER — HEPARIN SODIUM 1000 [USP'U]/ML
2000 INJECTION, SOLUTION INTRAVENOUS; SUBCUTANEOUS PRN
Status: DISCONTINUED | OUTPATIENT
Start: 2024-11-14 | End: 2024-11-15

## 2024-11-14 RX ORDER — KETOROLAC TROMETHAMINE 15 MG/ML
15 INJECTION, SOLUTION INTRAMUSCULAR; INTRAVENOUS ONCE
Status: COMPLETED | OUTPATIENT
Start: 2024-11-14 | End: 2024-11-14

## 2024-11-14 RX ORDER — PANTOPRAZOLE SODIUM 40 MG/1
40 TABLET, DELAYED RELEASE ORAL
Status: DISCONTINUED | OUTPATIENT
Start: 2024-11-14 | End: 2024-11-16 | Stop reason: HOSPADM

## 2024-11-14 RX ORDER — FUROSEMIDE 10 MG/ML
40 INJECTION INTRAMUSCULAR; INTRAVENOUS ONCE
Status: COMPLETED | OUTPATIENT
Start: 2024-11-14 | End: 2024-11-14

## 2024-11-14 RX ORDER — ASPIRIN 81 MG/1
243 TABLET, CHEWABLE ORAL ONCE
Status: COMPLETED | OUTPATIENT
Start: 2024-11-14 | End: 2024-11-14

## 2024-11-14 RX ORDER — ASPIRIN 81 MG/1
81 TABLET, CHEWABLE ORAL DAILY
Status: DISCONTINUED | OUTPATIENT
Start: 2024-11-15 | End: 2024-11-16 | Stop reason: HOSPADM

## 2024-11-14 RX ORDER — LORAZEPAM 0.5 MG/1
0.5 TABLET ORAL EVERY 4 HOURS PRN
Status: DISCONTINUED | OUTPATIENT
Start: 2024-11-14 | End: 2024-11-16 | Stop reason: HOSPADM

## 2024-11-14 RX ORDER — METOPROLOL SUCCINATE 25 MG/1
25 TABLET, EXTENDED RELEASE ORAL DAILY
Status: DISCONTINUED | OUTPATIENT
Start: 2024-11-14 | End: 2024-11-16 | Stop reason: HOSPADM

## 2024-11-14 RX ORDER — FUROSEMIDE 10 MG/ML
80 INJECTION INTRAMUSCULAR; INTRAVENOUS ONCE
Status: COMPLETED | OUTPATIENT
Start: 2024-11-14 | End: 2024-11-14

## 2024-11-14 RX ORDER — IOPAMIDOL 755 MG/ML
75 INJECTION, SOLUTION INTRAVASCULAR
Status: COMPLETED | OUTPATIENT
Start: 2024-11-14 | End: 2024-11-14

## 2024-11-14 RX ORDER — HEPARIN SODIUM 10000 [USP'U]/100ML
5-30 INJECTION, SOLUTION INTRAVENOUS CONTINUOUS
Status: DISCONTINUED | OUTPATIENT
Start: 2024-11-14 | End: 2024-11-15

## 2024-11-14 RX ADMIN — KETOROLAC TROMETHAMINE 15 MG: 15 INJECTION, SOLUTION INTRAMUSCULAR; INTRAVENOUS at 03:12

## 2024-11-14 RX ADMIN — PANTOPRAZOLE SODIUM 40 MG: 40 TABLET, DELAYED RELEASE ORAL at 07:15

## 2024-11-14 RX ADMIN — FUROSEMIDE 80 MG: 10 INJECTION, SOLUTION INTRAMUSCULAR; INTRAVENOUS at 09:30

## 2024-11-14 RX ADMIN — ACETAMINOPHEN 650 MG: 325 TABLET ORAL at 03:12

## 2024-11-14 RX ADMIN — CLOPIDOGREL BISULFATE 300 MG: 75 TABLET ORAL at 08:59

## 2024-11-14 RX ADMIN — ATORVASTATIN CALCIUM 40 MG: 40 TABLET, FILM COATED ORAL at 08:59

## 2024-11-14 RX ADMIN — HEPARIN SODIUM 4000 UNITS: 1000 INJECTION INTRAVENOUS; SUBCUTANEOUS at 05:03

## 2024-11-14 RX ADMIN — IPRATROPIUM BROMIDE AND ALBUTEROL SULFATE 2 DOSE: .5; 2.5 SOLUTION RESPIRATORY (INHALATION) at 03:13

## 2024-11-14 RX ADMIN — HEPARIN SODIUM 2000 UNITS: 1000 INJECTION INTRAVENOUS; SUBCUTANEOUS at 18:33

## 2024-11-14 RX ADMIN — FUROSEMIDE 40 MG: 10 INJECTION, SOLUTION INTRAMUSCULAR; INTRAVENOUS at 05:16

## 2024-11-14 RX ADMIN — NITROGLYCERIN 1 INCH: 20 OINTMENT TOPICAL at 18:32

## 2024-11-14 RX ADMIN — IOPAMIDOL 75 ML: 755 INJECTION, SOLUTION INTRAVENOUS at 03:56

## 2024-11-14 RX ADMIN — METOPROLOL SUCCINATE 25 MG: 25 TABLET, EXTENDED RELEASE ORAL at 08:30

## 2024-11-14 RX ADMIN — HEPARIN SODIUM 2000 UNITS: 1000 INJECTION INTRAVENOUS; SUBCUTANEOUS at 12:11

## 2024-11-14 RX ADMIN — ASPIRIN 243 MG: 81 TABLET, CHEWABLE ORAL at 05:01

## 2024-11-14 RX ADMIN — HEPARIN SODIUM AND DEXTROSE 11 UNITS/KG/HR: 10000; 5 INJECTION INTRAVENOUS at 05:08

## 2024-11-14 RX ADMIN — NITROGLYCERIN 1 INCH: 20 OINTMENT TOPICAL at 07:16

## 2024-11-14 RX ADMIN — NITROGLYCERIN 1 INCH: 20 OINTMENT TOPICAL at 12:18

## 2024-11-14 ASSESSMENT — LIFESTYLE VARIABLES
HOW MANY STANDARD DRINKS CONTAINING ALCOHOL DO YOU HAVE ON A TYPICAL DAY: 1 OR 2
HOW OFTEN DO YOU HAVE A DRINK CONTAINING ALCOHOL: 2-3 TIMES A WEEK

## 2024-11-14 NOTE — ED PROVIDER NOTES
[facility] EMERGENCY DEPARTMENT  EMERGENCY DEPARTMENT ENCOUNTER        Pt Name: Clifton Nguyễn  MRN: [id number]  Birthdate 1960  Date of evaluation: 11/14/2024  Provider: Nas Shah DO  PCP: Pankaj Martin DO  Note Started: 3:08 AM EST 11/14/24    CHIEF COMPLAINT       Chief Complaint   Patient presents with    Chest Pain     Patient reports chest pain and SOB worsening tonight causing nausea. Hx of stent placement.        HISTORY OF PRESENT ILLNESS: 1 or more Elements   History From: patient    Limitations to history : None    Clifton Nguyễn is a 63 y.o. male who presents chest pain and shortness of breath.  Reports symptoms began about 1 and half days ago.  Complains of shortness of breath worsen when he takes deep breath and at that time he feels a sharp pain throughout his chest left side right side middle going into his back.  Current everyday smoker, triage note noted, he denies previous history of cardiac disease or cardiac intervention but did have stents in his lower extremities placed in the past.  He is unsure of family history of cardiovascular disease.  Also planes of cough productive of white sputum.  He took aspirin this evening for the chest pain which did not change his pain.    Nursing Notes were all reviewed and agreed with or any disagreements were addressed in the HPI.      REVIEW OF EXTERNAL NOTE :       Family medicine note from 7/10/2020 was seen in follow-up for dyslipidemia    REVIEW OF SYSTEMS :           Positives and Pertinent negatives as per HPI.     SURGICAL HISTORY     Past Surgical History:   Procedure Laterality Date    CORONARY ANGIOPLASTY WITH STENT PLACEMENT      THYROID SURGERY         CURRENTMEDICATIONS       Previous Medications    ATORVASTATIN (LIPITOR) 40 MG TABLET    Take 1 tablet by mouth daily    FENOFIBRATE (TRICOR) 48 MG TABLET    Take 1 tablet by mouth daily    FLUTICASONE (FLONASE) 50 MCG/ACT NASAL SPRAY  the dictations but occasionally words are mis-transcribed.)    Nas Shah DO (electronically signed)            Nas Shah,   11/14/24 0557

## 2024-11-14 NOTE — CONSULTS
Today's Date: 11/14/2024  Patient Name: Clifton Nguyễn  Date of admission: 11/14/2024  2:43 AM  Patient's age: 63 y.o., 1960male    Admission Dx: Shortness of breath [R06.02]  Acute coronary syndrome (HCC) [I24.9]  ACS (acute coronary syndrome) (HCC) [I24.9]  Acute chest pain [R07.9]  NSTEMI (non-ST elevated myocardial infarction) (HCC) [I21.4]  Elevated d-dimer [R79.89]    Requesting Physician: Galindo Siddiqui DO    Chief Complaint   Patient presents with    Chest Pain     Patient reports chest pain and SOB worsening tonight causing nausea. Hx of stent placement.        HISTORY OF PRESENT ILLNESS:      63-year-old patient who is a heavy smoker for more than 50 years and has no previous known cardiac problems, presented to [facility] emergency room secondary to new onset angina and dyspnea with exertion.  Veronica Silva with electronic record normal his troponin was slightly elevated above 800.  His proBNP was also elevated        REVIEW OF SYSTEMS:    A comprehensive 14 point review of systems was negative except for what is in the HPI           Past Medical History:   has a past medical history of Hyperlipidemia.    Past Surgical History:   has a past surgical history that includes Coronary angioplasty with stent and Thyroid surgery.     Social History:   reports that he has been smoking. He started smoking about 54 years ago. He has a 54.9 pack-year smoking history. He has never used smokeless tobacco. He reports current alcohol use of about 4.0 standard drinks of alcohol per week. He reports that he does not use drugs.     Family History: No for premature CAD .    family history includes Cancer in his sister; Other in his mother..     Allergies:  Sulfa antibiotics    MEDICATIONS:   pantoprazole  40 mg Oral QAM AC    atorvastatin  40 mg Oral Daily    metoprolol succinate  25 mg Oral Daily    nitroglycerin  1 inch Topical 4 times per day    [START ON 11/15/2024] aspirin  81 mg Oral Daily  judgment.    CBC:   Recent Labs     11/14/24 0302   WBC 13.4*   HGB 13.6   HCT 40.8        BMP:   Recent Labs     11/14/24 0302      K 3.7   CO2 21*   BUN 20   CREATININE 0.9   LABGLOM >90   GLUCOSE 119*     BNP: No results for input(s): \"BNP\" in the last 72 hours.  PT/INR: No results for input(s): \"PROTIME\", \"INR\" in the last 72 hours.  APTT:  Recent Labs     11/14/24  0445 11/14/24  1040   APTT 34.9 57.6*     CARDIAC ENZYMES:No results for input(s): \"CKTOTAL\", \"CKMB\", \"CKMBINDEX\", \"TROPONINI\" in the last 72 hours.  FASTING LIPID PANEL:  Lab Results   Component Value Date/Time    HDL 48 06/26/2020 12:00 AM    TRIG 174 06/26/2020 12:00 AM     LIVER PROFILE:  Recent Labs     11/14/24 0302   AST 35   ALT 31       Radiology:  Echo (TTE) complete (PRN contrast/bubble/strain/3D)    Result Date: 11/14/2024    Left Ventricle: Severely reduced left ventricular systolic function with a visually estimated EF of 30 - 35%. Left ventricle is moderately dilated. Moderate global hypokinesis with severe anteroseptal hypokinesis and paradoxical septal motion. Abnormal diastolic function.   Right Ventricle: Moderately reduced systolic function.   Mitral Valve: Moderate to severe regurgitation.   Tricuspid Valve: Moderate to severe regurgitation.   Pulmonic Valve: Trace regurgitation.   Left Atrium: Left atrium is mildly dilated.   Right Atrium: Right atrium is mildly dilated.   Pulmonary Arteries: Mild pulmonary hypertension present.   IVC/SVC: IVC is mildly dilated.   Image quality is adequate.     CTA PULMONARY W CONTRAST    Result Date: 11/14/2024  EXAMINATION: CTA OF THE CHEST 11/14/2024 3:49 am TECHNIQUE: CTA of the chest was performed after the administration of intravenous contrast.  Multiplanar reformatted images are provided for review.  MIP images are provided for review. Automated exposure control, iterative reconstruction, and/or weight based adjustment of the mA/kV was utilized to reduce the radiation

## 2024-11-14 NOTE — H&P
Dictate [id number]    Clifton was counseled on smoking cessation. Clifton smokes approximately 20 cigarettes per day x 50 years. We have had extensive discussion regarding the need to quit smoking, the negative health implications of smoking overall, as well as the impact on Clifton's comorbid conditions. Clifton does  voice a willingness to quit smoking and we have not set a quit date.  We have discussed potential methods for smoking cessation including nicotine replacement via patch, gum or lozenge, behavioral and lifestyle modifications, and follow-up with primary care provider for consideration of medications for smoking cessation as well.  We have also discussed additional resources such as CDC site for additional information, quitassist.com, and Quitline [place].  We have discussed the arrangement of follow-up with primary care provider to discuss progress as well as the potential institution of medications if required/desired.  The amount of time spent counseling the patient directly regarding smoking cessation is greater than 10 minutes.

## 2024-11-15 LAB
25(OH)D3 SERPL-MCNC: 23.5 NG/ML (ref 30–100)
ACTIVATED CLOTTING TIME, LOW RANGE: 264 SEC
ALBUMIN SERPL-MCNC: 3.3 G/DL (ref 3.5–5.2)
ALP SERPL-CCNC: 105 U/L (ref 40–129)
ALT SERPL-CCNC: 23 U/L (ref 0–40)
ANION GAP SERPL CALCULATED.3IONS-SCNC: 11 MMOL/L (ref 7–16)
AST SERPL-CCNC: 23 U/L (ref 0–39)
BASOPHILS # BLD: 0.06 K/UL (ref 0–0.2)
BASOPHILS NFR BLD: 1 % (ref 0–2)
BILIRUB SERPL-MCNC: 0.5 MG/DL (ref 0–1.2)
BUN SERPL-MCNC: 23 MG/DL (ref 6–23)
CALCIUM SERPL-MCNC: 8.6 MG/DL (ref 8.6–10.2)
CHLORIDE SERPL-SCNC: 103 MMOL/L (ref 98–107)
CHOLEST SERPL-MCNC: 122 MG/DL
CO2 SERPL-SCNC: 25 MMOL/L (ref 22–29)
CREAT SERPL-MCNC: 0.9 MG/DL (ref 0.7–1.2)
EKG ATRIAL RATE: 68 BPM
EKG P AXIS: 60 DEGREES
EKG P-R INTERVAL: 136 MS
EKG Q-T INTERVAL: 484 MS
EKG QRS DURATION: 166 MS
EKG QTC CALCULATION (BAZETT): 514 MS
EKG R AXIS: 92 DEGREES
EKG T AXIS: -17 DEGREES
EKG VENTRICULAR RATE: 68 BPM
EOSINOPHIL # BLD: 0.23 K/UL (ref 0.05–0.5)
EOSINOPHILS RELATIVE PERCENT: 2 % (ref 0–6)
ERYTHROCYTE [DISTWIDTH] IN BLOOD BY AUTOMATED COUNT: 13.3 % (ref 11.5–15)
FOLATE SERPL-MCNC: 16.5 NG/ML (ref 4.8–24.2)
GFR, ESTIMATED: >90 ML/MIN/1.73M2
GLUCOSE SERPL-MCNC: 106 MG/DL (ref 74–99)
HCT VFR BLD AUTO: 35.6 % (ref 37–54)
HDLC SERPL-MCNC: 36 MG/DL
HGB BLD-MCNC: 11.9 G/DL (ref 12.5–16.5)
IMM GRANULOCYTES # BLD AUTO: 0.06 K/UL (ref 0–0.58)
IMM GRANULOCYTES NFR BLD: 1 % (ref 0–5)
IRON SATN MFR SERPL: 8 % (ref 20–55)
IRON SERPL-MCNC: 17 UG/DL (ref 59–158)
LDLC SERPL CALC-MCNC: 65 MG/DL
LYMPHOCYTES NFR BLD: 2.32 K/UL (ref 1.5–4)
LYMPHOCYTES RELATIVE PERCENT: 22 % (ref 20–42)
MAGNESIUM SERPL-MCNC: 2.1 MG/DL (ref 1.6–2.6)
MCH RBC QN AUTO: 31.5 PG (ref 26–35)
MCHC RBC AUTO-ENTMCNC: 33.4 G/DL (ref 32–34.5)
MCV RBC AUTO: 94.2 FL (ref 80–99.9)
MONOCYTES NFR BLD: 1.04 K/UL (ref 0.1–0.95)
MONOCYTES NFR BLD: 10 % (ref 2–12)
NEUTROPHILS NFR BLD: 65 % (ref 43–80)
NEUTS SEG NFR BLD: 6.76 K/UL (ref 1.8–7.3)
PARTIAL THROMBOPLASTIN TIME: 118.3 SEC (ref 24.5–35.1)
PARTIAL THROMBOPLASTIN TIME: 67.7 SEC (ref 24.5–35.1)
PLATELET # BLD AUTO: 191 K/UL (ref 130–450)
PMV BLD AUTO: 11 FL (ref 7–12)
POTASSIUM SERPL-SCNC: 3 MMOL/L (ref 3.5–5)
PROT SERPL-MCNC: 6 G/DL (ref 6.4–8.3)
RBC # BLD AUTO: 3.78 M/UL (ref 3.8–5.8)
SODIUM SERPL-SCNC: 139 MMOL/L (ref 132–146)
T4 FREE SERPL-MCNC: 1.3 NG/DL (ref 0.9–1.7)
TIBC SERPL-MCNC: 210 UG/DL (ref 250–450)
TRIGL SERPL-MCNC: 105 MG/DL
TROPONIN I SERPL HS-MCNC: 836 NG/L (ref 0–11)
TSH SERPL DL<=0.05 MIU/L-ACNC: 1.96 UIU/ML (ref 0.27–4.2)
VIT B12 SERPL-MCNC: 1015 PG/ML (ref 211–946)
VLDLC SERPL CALC-MCNC: 21 MG/DL
WBC OTHER # BLD: 10.5 K/UL (ref 4.5–11.5)

## 2024-11-15 PROCEDURE — B2151ZZ FLUOROSCOPY OF LEFT HEART USING LOW OSMOLAR CONTRAST: ICD-10-PCS | Performed by: INTERNAL MEDICINE

## 2024-11-15 PROCEDURE — C1887 CATHETER, GUIDING: HCPCS | Performed by: INTERNAL MEDICINE

## 2024-11-15 PROCEDURE — 99152 MOD SED SAME PHYS/QHP 5/>YRS: CPT | Performed by: INTERNAL MEDICINE

## 2024-11-15 PROCEDURE — 2580000003 HC RX 258: Performed by: INTERNAL MEDICINE

## 2024-11-15 PROCEDURE — B2111ZZ FLUOROSCOPY OF MULTIPLE CORONARY ARTERIES USING LOW OSMOLAR CONTRAST: ICD-10-PCS | Performed by: INTERNAL MEDICINE

## 2024-11-15 PROCEDURE — 84484 ASSAY OF TROPONIN QUANT: CPT

## 2024-11-15 PROCEDURE — 93458 L HRT ARTERY/VENTRICLE ANGIO: CPT

## 2024-11-15 PROCEDURE — 6370000000 HC RX 637 (ALT 250 FOR IP): Performed by: INTERNAL MEDICINE

## 2024-11-15 PROCEDURE — 80053 COMPREHEN METABOLIC PANEL: CPT

## 2024-11-15 PROCEDURE — 83540 ASSAY OF IRON: CPT

## 2024-11-15 PROCEDURE — 2060000000 HC ICU INTERMEDIATE R&B

## 2024-11-15 PROCEDURE — 93458 L HRT ARTERY/VENTRICLE ANGIO: CPT | Performed by: INTERNAL MEDICINE

## 2024-11-15 PROCEDURE — 2709999900 HC NON-CHARGEABLE SUPPLY: Performed by: INTERNAL MEDICINE

## 2024-11-15 PROCEDURE — 83550 IRON BINDING TEST: CPT

## 2024-11-15 PROCEDURE — 92944 HC PRQ CARD REVASC CHRONIC ADDL: CPT

## 2024-11-15 PROCEDURE — C1874 STENT, COATED/COV W/DEL SYS: HCPCS | Performed by: INTERNAL MEDICINE

## 2024-11-15 PROCEDURE — 027035Z DILATION OF CORONARY ARTERY, ONE ARTERY WITH TWO DRUG-ELUTING INTRALUMINAL DEVICES, PERCUTANEOUS APPROACH: ICD-10-PCS | Performed by: INTERNAL MEDICINE

## 2024-11-15 PROCEDURE — 85730 THROMBOPLASTIN TIME PARTIAL: CPT

## 2024-11-15 PROCEDURE — 85025 COMPLETE CBC W/AUTO DIFF WBC: CPT

## 2024-11-15 PROCEDURE — 92943 PRQ TRLUML REVSC CH OCC ANT: CPT

## 2024-11-15 PROCEDURE — 92944 HC PRQ CARD REVASC CHRONIC ADDL: CPT | Performed by: INTERNAL MEDICINE

## 2024-11-15 PROCEDURE — 80061 LIPID PANEL: CPT

## 2024-11-15 PROCEDURE — 7100000001 HC PACU RECOVERY - ADDTL 15 MIN: Performed by: INTERNAL MEDICINE

## 2024-11-15 PROCEDURE — 82306 VITAMIN D 25 HYDROXY: CPT

## 2024-11-15 PROCEDURE — C1894 INTRO/SHEATH, NON-LASER: HCPCS | Performed by: INTERNAL MEDICINE

## 2024-11-15 PROCEDURE — 6370000000 HC RX 637 (ALT 250 FOR IP)

## 2024-11-15 PROCEDURE — 6360000004 HC RX CONTRAST MEDICATION: Performed by: INTERNAL MEDICINE

## 2024-11-15 PROCEDURE — 92943 PRQ TRLUML REVSC CH OCC ANT: CPT | Performed by: INTERNAL MEDICINE

## 2024-11-15 PROCEDURE — 82607 VITAMIN B-12: CPT

## 2024-11-15 PROCEDURE — 84443 ASSAY THYROID STIM HORMONE: CPT

## 2024-11-15 PROCEDURE — 99153 MOD SED SAME PHYS/QHP EA: CPT | Performed by: INTERNAL MEDICINE

## 2024-11-15 PROCEDURE — 6360000002 HC RX W HCPCS: Performed by: INTERNAL MEDICINE

## 2024-11-15 PROCEDURE — 84439 ASSAY OF FREE THYROXINE: CPT

## 2024-11-15 PROCEDURE — 36415 COLL VENOUS BLD VENIPUNCTURE: CPT

## 2024-11-15 PROCEDURE — 94640 AIRWAY INHALATION TREATMENT: CPT

## 2024-11-15 PROCEDURE — 83735 ASSAY OF MAGNESIUM: CPT

## 2024-11-15 PROCEDURE — 2500000003 HC RX 250 WO HCPCS: Performed by: INTERNAL MEDICINE

## 2024-11-15 PROCEDURE — C1725 CATH, TRANSLUMIN NON-LASER: HCPCS | Performed by: INTERNAL MEDICINE

## 2024-11-15 PROCEDURE — 4A023N7 MEASUREMENT OF CARDIAC SAMPLING AND PRESSURE, LEFT HEART, PERCUTANEOUS APPROACH: ICD-10-PCS | Performed by: INTERNAL MEDICINE

## 2024-11-15 PROCEDURE — 82746 ASSAY OF FOLIC ACID SERUM: CPT

## 2024-11-15 PROCEDURE — C1769 GUIDE WIRE: HCPCS | Performed by: INTERNAL MEDICINE

## 2024-11-15 PROCEDURE — 7100000000 HC PACU RECOVERY - FIRST 15 MIN: Performed by: INTERNAL MEDICINE

## 2024-11-15 PROCEDURE — 85347 COAGULATION TIME ACTIVATED: CPT

## 2024-11-15 DEVICE — STENT ONYXNG25038UX ONYX 2.50X38RX
Type: IMPLANTABLE DEVICE | Status: FUNCTIONAL
Brand: ONYX FRONTIER™

## 2024-11-15 DEVICE — STENT ONYXNG30038UX ONYX 3.00X38RX
Type: IMPLANTABLE DEVICE | Status: FUNCTIONAL
Brand: ONYX FRONTIER™

## 2024-11-15 RX ORDER — FENTANYL CITRATE 50 UG/ML
INJECTION, SOLUTION INTRAMUSCULAR; INTRAVENOUS PRN
Status: DISCONTINUED | OUTPATIENT
Start: 2024-11-15 | End: 2024-11-15 | Stop reason: HOSPADM

## 2024-11-15 RX ORDER — LOSARTAN POTASSIUM 50 MG/1
25 TABLET ORAL DAILY
Status: DISCONTINUED | OUTPATIENT
Start: 2024-11-15 | End: 2024-11-16 | Stop reason: HOSPADM

## 2024-11-15 RX ORDER — SODIUM CHLORIDE 9 MG/ML
INJECTION, SOLUTION INTRAVENOUS CONTINUOUS
Status: ACTIVE | OUTPATIENT
Start: 2024-11-15 | End: 2024-11-15

## 2024-11-15 RX ORDER — IOPAMIDOL 755 MG/ML
INJECTION, SOLUTION INTRAVASCULAR PRN
Status: DISCONTINUED | OUTPATIENT
Start: 2024-11-15 | End: 2024-11-15 | Stop reason: HOSPADM

## 2024-11-15 RX ORDER — SODIUM CHLORIDE 0.9 % (FLUSH) 0.9 %
5-40 SYRINGE (ML) INJECTION EVERY 12 HOURS SCHEDULED
Status: DISCONTINUED | OUTPATIENT
Start: 2024-11-15 | End: 2024-11-16 | Stop reason: HOSPADM

## 2024-11-15 RX ORDER — SPIRONOLACTONE 25 MG/1
25 TABLET ORAL DAILY
Status: DISCONTINUED | OUTPATIENT
Start: 2024-11-15 | End: 2024-11-16 | Stop reason: HOSPADM

## 2024-11-15 RX ORDER — SODIUM CHLORIDE 0.9 % (FLUSH) 0.9 %
5-40 SYRINGE (ML) INJECTION PRN
Status: DISCONTINUED | OUTPATIENT
Start: 2024-11-15 | End: 2024-11-16 | Stop reason: HOSPADM

## 2024-11-15 RX ORDER — MIDAZOLAM HYDROCHLORIDE 1 MG/ML
INJECTION, SOLUTION INTRAMUSCULAR; INTRAVENOUS PRN
Status: DISCONTINUED | OUTPATIENT
Start: 2024-11-15 | End: 2024-11-15 | Stop reason: HOSPADM

## 2024-11-15 RX ORDER — IPRATROPIUM BROMIDE AND ALBUTEROL SULFATE 2.5; .5 MG/3ML; MG/3ML
1 SOLUTION RESPIRATORY (INHALATION)
Status: DISCONTINUED | OUTPATIENT
Start: 2024-11-15 | End: 2024-11-16 | Stop reason: HOSPADM

## 2024-11-15 RX ORDER — CLOPIDOGREL BISULFATE 75 MG/1
75 TABLET ORAL DAILY
Status: DISCONTINUED | OUTPATIENT
Start: 2024-11-15 | End: 2024-11-16 | Stop reason: HOSPADM

## 2024-11-15 RX ORDER — HEPARIN SODIUM 1000 [USP'U]/ML
INJECTION, SOLUTION INTRAVENOUS; SUBCUTANEOUS PRN
Status: DISCONTINUED | OUTPATIENT
Start: 2024-11-15 | End: 2024-11-15 | Stop reason: HOSPADM

## 2024-11-15 RX ORDER — ASPIRIN 81 MG/1
81 TABLET, CHEWABLE ORAL ONCE
Status: COMPLETED | OUTPATIENT
Start: 2024-11-15 | End: 2024-11-15

## 2024-11-15 RX ORDER — ACETAMINOPHEN 325 MG/1
650 TABLET ORAL EVERY 4 HOURS PRN
Status: DISCONTINUED | OUTPATIENT
Start: 2024-11-15 | End: 2024-11-16 | Stop reason: HOSPADM

## 2024-11-15 RX ORDER — CLOPIDOGREL BISULFATE 75 MG/1
300 TABLET ORAL ONCE
Status: COMPLETED | OUTPATIENT
Start: 2024-11-15 | End: 2024-11-15

## 2024-11-15 RX ORDER — SODIUM CHLORIDE 9 MG/ML
INJECTION, SOLUTION INTRAVENOUS PRN
Status: DISCONTINUED | OUTPATIENT
Start: 2024-11-15 | End: 2024-11-16 | Stop reason: HOSPADM

## 2024-11-15 RX ORDER — POTASSIUM CHLORIDE 1500 MG/1
20 TABLET, EXTENDED RELEASE ORAL ONCE
Status: COMPLETED | OUTPATIENT
Start: 2024-11-15 | End: 2024-11-15

## 2024-11-15 RX ADMIN — IPRATROPIUM BROMIDE AND ALBUTEROL SULFATE 1 DOSE: 2.5; .5 SOLUTION RESPIRATORY (INHALATION) at 14:06

## 2024-11-15 RX ADMIN — ASPIRIN 81 MG: 81 TABLET, CHEWABLE ORAL at 11:45

## 2024-11-15 RX ADMIN — ATORVASTATIN CALCIUM 40 MG: 40 TABLET, FILM COATED ORAL at 07:47

## 2024-11-15 RX ADMIN — IPRATROPIUM BROMIDE AND ALBUTEROL SULFATE 1 DOSE: 2.5; .5 SOLUTION RESPIRATORY (INHALATION) at 18:44

## 2024-11-15 RX ADMIN — LOSARTAN POTASSIUM 25 MG: 50 TABLET, FILM COATED ORAL at 14:56

## 2024-11-15 RX ADMIN — NITROGLYCERIN 1 INCH: 20 OINTMENT TOPICAL at 11:45

## 2024-11-15 RX ADMIN — NITROGLYCERIN 1 INCH: 20 OINTMENT TOPICAL at 00:05

## 2024-11-15 RX ADMIN — CLOPIDOGREL BISULFATE 300 MG: 300 TABLET, FILM COATED ORAL at 10:40

## 2024-11-15 RX ADMIN — SODIUM CHLORIDE: 9 INJECTION, SOLUTION INTRAVENOUS at 14:11

## 2024-11-15 RX ADMIN — POTASSIUM CHLORIDE 20 MEQ: 1500 TABLET, EXTENDED RELEASE ORAL at 11:45

## 2024-11-15 RX ADMIN — NITROGLYCERIN 1 INCH: 20 OINTMENT TOPICAL at 05:35

## 2024-11-15 RX ADMIN — SPIRONOLACTONE 25 MG: 25 TABLET ORAL at 14:57

## 2024-11-15 RX ADMIN — ASPIRIN 81 MG: 81 TABLET, CHEWABLE ORAL at 07:45

## 2024-11-15 RX ADMIN — CLOPIDOGREL BISULFATE 75 MG: 75 TABLET ORAL at 14:57

## 2024-11-15 ASSESSMENT — PAIN DESCRIPTION - DESCRIPTORS: DESCRIPTORS: DISCOMFORT

## 2024-11-15 ASSESSMENT — PAIN DESCRIPTION - ORIENTATION: ORIENTATION: LEFT

## 2024-11-15 ASSESSMENT — PAIN DESCRIPTION - LOCATION: LOCATION: CHEST

## 2024-11-15 NOTE — PROGRESS NOTES
Met with patient and discussed our outpatient Phase II Cardiac Rehabilitation program. Reviewed the benefits of cardiac rehabilitation based on their diagnosis and personal risk factors. Patient demonstrates moderate interest in Cardiac Rehabilitation at this time.  Cardiac Rehabilitation brochure provided to patient/family. The Cardiac Rehabilitation Program has been provided the patient's referral information and pertinent patient details and history. The patient may call [facility]iac [facility] at [phone] for additional information or questions. Contact information for [facility] Cardiac Rehabilitation and other choices close to the patient's residence have been provided in the discharge instructions so that the patient may call and schedule an appointment when cleared by their physician.

## 2024-11-15 NOTE — PROGRESS NOTES
Date:  11/15/2024  Patient: Clifton Nguyễn  Admission:  11/14/2024  2:43 AM  Admit DX: Shortness of breath [R06.02]  Acute coronary syndrome (HCC) [I24.9]  ACS (acute coronary syndrome) (HCC) [I24.9]  Acute chest pain [R07.9]  NSTEMI (non-ST elevated myocardial infarction) (HCC) [I21.4]  Elevated d-dimer [R79.89]  Age:  63 y.o., 1960     LOS: 1 day         Subjective:    Patient is comfortable.  Vitals are stable.  Afebrile.  No respiratory distress.  No chest pain      OBJECTIVE:    BP (!) 128/50   Pulse 68   Temp 98.1 °F (36.7 °C) (Oral)   Resp 17   Ht 1.753 m (5' 9\")   Wt 87.6 kg (193 lb 3 oz)   SpO2 98%   BMI 28.53 kg/m²     Intake/Output Summary (Last 24 hours) at 11/15/2024 1355  Last data filed at 11/15/2024 0958  Gross per 24 hour   Intake --   Output 910 ml   Net -910 ml       Examination:    General: Alert and oriented, No acute distress.  Appears as stated age.  Eye:  Pupils are equal, round and reactive to light, Extraocular movements are intact, Normal conjunctiva.    Head: Normocephalic, Normal hearing, Oral mucosa is moist.  Neck: Supple, No carotid bruit, No jugular venous distention, No lymphadenopathy.  Respiratory: Lungs are clear to auscultation, Respirations are non-labored, Breath sounds are equal, Symmetrical chest wall expansion.  Cardiovascular: Normal rate, PMI shifted laterally, No gallop, Good pulses equal in all extremities, No edema.  Gastrointestinal: Soft, Non-tender, Non-distended, Normal bowel sounds.  Musculoskeletal: Normal strength, No tenderness, No deformity.  Integumentary:  Warm, Dry.    Neurologic: Alert, Oriented, No focal deficits.  Cognition and Speech: Oriented, Speech clear and coherent.  Psychiatric: Cooperative, Appropriate mood & affect, Normal judgment.    ECG:    Current Inpatient Medications:   ipratropium 0.5 mg-albuterol 2.5 mg  1 Dose Inhalation 4x Daily RT    losartan  25 mg Oral Daily    spironolactone  25 mg Oral Daily    clopidogrel  75 mg

## 2024-11-15 NOTE — PROGRESS NOTES
Pt's pulse ox dropped to 85%. Went to check on pt and he was sound asleep snoring. Woke pt up and had him take a few deep breaths and pulse ox quickly returned to 95%. Dr. Siddiqui was notified and at bedside with pt shortly after happening. Breathing treatments were ordered. Pt again fell asleep and pulse ox dropped to the 80's again. Went and woke pt up and he said he wanted to take a nap so NC was applied at 2L. Pt denies any past hx of sleep apnea.

## 2024-11-15 NOTE — H&P
[place] [id number]                           HISTORY & PHYSICAL      PATIENT NAME: MYRNA CORLEY            : 1960  MED REC NO: [id number]                        ROOM: [id number]  ACCOUNT NO: [id number]                       ADMIT DATE: 2024  PROVIDER: Galindo Siddiqui DO      CHIEF COMPLAINT AND HISTORY OF CHIEF COMPLAINT:  This is a pleasant 63-year-old white male who is admitted to [facility].  The patient presented to the hospital here through the emergency room early morning with chief complaint of significant amount of chest pain.  This pleasant 63-year-old white male presented to the hospital here at which time he was complaining of chest pain with shortness of breath.  This started approximately an hour and a half.  This has been present for approximately one and a half day prior to admission.  The patient over the past week, but more over the past several days, has had progressive shortness of breath and difficulty breathing.  The patient also relates having some sharp stabbing chest pain confined to the left chest area radiating to the jaw and to the back area.  The patient does have a history of smoking.  The patient presented to the hospital here, was seen and evaluated.  As noted, over the past week, he has been having increasing amount of fatigue and generalized tiredness.  The patient presented here with chest pain.  His EKG at that time was abnormal and a troponin level was elevated 876.  Diagnosis of acute coronary syndrome was made time at this time.  Discussion with Dr. Marinelli was obtained and the patient was admitted to the hospital under the service of Dr. Siddiqui and Dr. Vargas.  The patient was seen in the emergency room.  Exam at this time revealed a 63-year-old white male who was feeling somewhat better.  Heparin was instituted at this time.  From a cardiac standpoint of view, the patient

## 2024-11-15 NOTE — PROGRESS NOTES
Internal Medicine Consult Note    [facility]    Galindo Siddiqui D.O., LETICIAOChristopherI.                    Gaston Vargas D.O., LETICIAOChristopherI.                             Drew Reinoso D.O.     Leonor Proctor, MSN, APRN, NP-C  Benigno Curtis, MSN, APRN-CNP  Ulises Berman, MSN, APRN-CNP  Sarah Alcazar, MSN APRN-CNP  Susan Nicole, MSN. APRN-NP-C     Primary Care Physician: Pankaj Martin DO   Admitting Physician:  Galindo Siddiqui DO  Admission date and time: 11/14/2024  2:43 AM    Room:  [address] [place] [address]  Admitting diagnosis: Shortness of breath [R06.02]  Acute coronary syndrome (HCC) [I24.9]  ACS (acute coronary syndrome) (HCC) [I24.9]  Acute chest pain [R07.9]  NSTEMI (non-ST elevated myocardial infarction) (HCC) [I21.4]  Elevated d-dimer [R79.89]    Patient Name: Clifton Nguyễn  MRN: [id number]    Date of Service: 11/15/2024     Subjective:  Clifton is a 63 y.o. male who was seen and examined today,11/15/2024, at the bedside.  Patient is seen examined at the bedside after his cardiac catheterization this morning.  Patient did receive 2 stents.  He denies any pain or discomfort he states he feels a lot better now.    No family present during my examination.    Review of System:   Constitutional:   Denies fever or chills, weight loss or gain, fatigue or malaise.  HEENT:   Denies ear pain, sore throat, sinus or eye problems.  Cardiovascular:   Denies any  irregular heartbeats, or palpitations.  Reports chest pain on admission currently relieved  Respiratory:   Denies shortness of breath, coughing, sputum production, hemoptysis, or wheezing.  Gastrointestinal:   Denies nausea, vomiting, diarrhea, or constipation.  Denies any abdominal pain.  Genitourinary:    Denies any urgency, frequency, hematuria. Voiding  without difficulty.  Extremities:   Denies lower extremity swelling, edema or cyanosis.   Neurology:    Denies any headache or focal neurological deficits, Denies generalized weakness or

## 2024-11-15 NOTE — PLAN OF CARE
Problem: Discharge Planning  Goal: Discharge to home or other facility with appropriate resources  Outcome: Progressing  Flowsheets (Taken 11/14/2024 2042)  Discharge to home or other facility with appropriate resources: Identify barriers to discharge with patient and caregiver     Problem: Safety - Adult  Goal: Free from fall injury  Outcome: Progressing     Problem: ABCDS Injury Assessment  Goal: Absence of physical injury  Outcome: Progressing

## 2024-11-15 NOTE — PROGRESS NOTES
Pt aPTT 118.3. Heparin drip held for 60 minutes at 0122, restarted at 0225. Rate reduced 3 u/kg/hr from 15u to 12u per protocol. Next redraw aPTT at 0725.

## 2024-11-15 NOTE — CARE COORDINATION
Case Management Assessment  Initial Evaluation    Date/Time of Evaluation: 11/15/2024 3:36 PM  Assessment Completed by: HARRISON Craig    If patient is discharged prior to next notation, then this note serves as note for discharge by case management.    Patient Name: Clifton Nguyễn                   YOB: 1960  Diagnosis: Shortness of breath [R06.02]  Acute coronary syndrome (HCC) [I24.9]  ACS (acute coronary syndrome) (HCC) [I24.9]  Acute chest pain [R07.9]  NSTEMI (non-ST elevated myocardial infarction) (HCC) [I21.4]  Elevated d-dimer [R79.89]                   Date / Time: 11/14/2024  2:43 AM    Patient Admission Status: Inpatient   Readmission Risk (Low < 19, Mod (19-27), High > 27): Readmission Risk Score: 6.2    Current PCP: Pankaj Martin, DO  PCP verified by CM? Yes    Chart Reviewed: Yes      History Provided by: Patient  Patient Orientation: Alert and Oriented    Patient Cognition: Alert    Hospitalization in the last 30 days (Readmission):  No    If yes, Readmission Assessment in CM Navigator will be completed.    Advance Directives:      Code Status: Full Code   Patient's Primary Decision Maker is: Legal Next of Kin    Primary Decision Maker: Lorrie Stephens - Child - [phone]    Discharge Planning:    Patient lives with:   Type of Home:    Primary Care Giver: Self  Patient Support Systems include: Spouse/Significant Other, Family Members   Current Financial resources: Other (Comment) ([facility])  Current community resources: None  Current services prior to admission:              Current DME:              Type of Home Care services:       ADLS  Prior functional level: Independent in ADLs/IADLs  Current functional level: Independent in ADLs/IADLs    PT AM-PAC:   /24  OT AM-PAC:   /24    Family can provide assistance at DC: No  Would you like Case Management to discuss the discharge plan with any other family members/significant others, and if so, who? No  Plans to

## 2024-11-15 NOTE — PROGRESS NOTES
Patient came down to special procedures for cardiac catheterization with Dr. Marinelli.       Total amount of sedation medication given during procedure: 3 mg of IV Versed and 25 mcg of IV Fentanyl    10 cc of air in right Radial band and Radial pulse is 2.  Right wrist to remain straight, patient not to bend wrist.    1025 - 30 minute post procedure /64 62 18 93% on RA    Right hand color appropriate to skin color,  pulse of 2, no numbness or tingling, no hematoma.    Patient is alert and oriented x 4     nurse to nurse called, spoke with Lynne MARIN, nurse notified of above information.  Nurse assumed post sedation vital signs    Patient transported back to the 5th floor.

## 2024-11-15 NOTE — DISCHARGE INSTRUCTIONS
Cardiac Rehabilitation: Discharge instructions        Cardiac rehabilitation is a program for people who have a heart problem, such as a heart attack, coronary stent placed, heart failure, or a heart valve disease. The program includes exercise, lifestyle changes, education, and emotional support. Cardiac rehab can help you improve the quality of your life through better overall health. It can help you lose weight and feel better about yourself.    On your cardiac rehab team, you may have your doctor, a nurse specialist, an exercise physiologist, and a dietitian. They will design your cardiac rehab program specifically for you. You will learn how to reduce your risk for heart problems, how to manage stress, and how to eat a heart-healthy diet. By the end of the program, you will be ready to maintain a healthier lifestyle on your own.    Follow-up care is a key part of your treatment and safety. Be sure to make and go to all appointments, and call your doctor if you are having problems. It's also a good idea to know your test results and keep a list of the medicines you take.    Please call to schedule your first appointment once you have been cleared by your cardiologist to attend Phase II Outpatient Cardiac Rehabilitation.       Cardiac Rehabilitation Options:  [facility] Cardiac Rehab             [facility]  [address]                                                                                          Cardiology Services  [place], [place] [address]                                                                              [address]  P- [phone]                                                                                         [place] [address]  Hours: M/W/F 7am-7pm & T/Th 7am-12pm                                                  [phone]                                                                                                                           [phone]  [facility]  Cardiac Rehab  [address], [place]                                                                                                 [facility]  P- [phone]                                                                                         Cardiac Rehabilitation  Hours: M/W/F 7am-6pm                                                                                [address] [facility] [place] [address]  [facility]                                                          [phone]  Cardiac Rehabilitation                                                                                   [phone]  [address] [place] [address]                                                                                        [facility]  [phone]                                                                                          Cardiac Rehabilitation  [phone]                                                                                          [address] [facility]                                                                                                                        [place] [address]                                                                                                                        [phone]                                                                                                                        [phone]

## 2024-11-16 VITALS
HEART RATE: 50 BPM | WEIGHT: 193.19 LBS | RESPIRATION RATE: 20 BRPM | BODY MASS INDEX: 28.61 KG/M2 | DIASTOLIC BLOOD PRESSURE: 60 MMHG | OXYGEN SATURATION: 92 % | SYSTOLIC BLOOD PRESSURE: 124 MMHG | HEIGHT: 69 IN | TEMPERATURE: 98.1 F

## 2024-11-16 LAB
ANION GAP SERPL CALCULATED.3IONS-SCNC: 12 MMOL/L (ref 7–16)
BASOPHILS # BLD: 0.06 K/UL (ref 0–0.2)
BASOPHILS NFR BLD: 1 % (ref 0–2)
BUN SERPL-MCNC: 15 MG/DL (ref 6–23)
CALCIUM SERPL-MCNC: 8.7 MG/DL (ref 8.6–10.2)
CHLORIDE SERPL-SCNC: 108 MMOL/L (ref 98–107)
CO2 SERPL-SCNC: 23 MMOL/L (ref 22–29)
CREAT SERPL-MCNC: 0.7 MG/DL (ref 0.7–1.2)
ECHO BSA: 2.07 M2
EKG ATRIAL RATE: 74 BPM
EKG P AXIS: 75 DEGREES
EKG P-R INTERVAL: 132 MS
EKG Q-T INTERVAL: 466 MS
EKG QRS DURATION: 164 MS
EKG QTC CALCULATION (BAZETT): 517 MS
EKG R AXIS: 83 DEGREES
EKG T AXIS: -27 DEGREES
EKG VENTRICULAR RATE: 74 BPM
EOSINOPHIL # BLD: 0.42 K/UL (ref 0.05–0.5)
EOSINOPHILS RELATIVE PERCENT: 5 % (ref 0–6)
ERYTHROCYTE [DISTWIDTH] IN BLOOD BY AUTOMATED COUNT: 13.2 % (ref 11.5–15)
GFR, ESTIMATED: >90 ML/MIN/1.73M2
GLUCOSE SERPL-MCNC: 149 MG/DL (ref 74–99)
HCT VFR BLD AUTO: 38.3 % (ref 37–54)
HGB BLD-MCNC: 12.4 G/DL (ref 12.5–16.5)
IMM GRANULOCYTES # BLD AUTO: 0.05 K/UL (ref 0–0.58)
IMM GRANULOCYTES NFR BLD: 1 % (ref 0–5)
LYMPHOCYTES NFR BLD: 1.42 K/UL (ref 1.5–4)
LYMPHOCYTES RELATIVE PERCENT: 16 % (ref 20–42)
MCH RBC QN AUTO: 31.4 PG (ref 26–35)
MCHC RBC AUTO-ENTMCNC: 32.4 G/DL (ref 32–34.5)
MCV RBC AUTO: 97 FL (ref 80–99.9)
MONOCYTES NFR BLD: 0.75 K/UL (ref 0.1–0.95)
MONOCYTES NFR BLD: 8 % (ref 2–12)
NEUTROPHILS NFR BLD: 70 % (ref 43–80)
NEUTS SEG NFR BLD: 6.37 K/UL (ref 1.8–7.3)
PLATELET # BLD AUTO: 224 K/UL (ref 130–450)
PMV BLD AUTO: 10.5 FL (ref 7–12)
POTASSIUM SERPL-SCNC: 3.7 MMOL/L (ref 3.5–5)
RBC # BLD AUTO: 3.95 M/UL (ref 3.8–5.8)
SODIUM SERPL-SCNC: 143 MMOL/L (ref 132–146)
WBC OTHER # BLD: 9.1 K/UL (ref 4.5–11.5)

## 2024-11-16 PROCEDURE — 6370000000 HC RX 637 (ALT 250 FOR IP)

## 2024-11-16 PROCEDURE — 93005 ELECTROCARDIOGRAM TRACING: CPT | Performed by: INTERNAL MEDICINE

## 2024-11-16 PROCEDURE — 2580000003 HC RX 258: Performed by: INTERNAL MEDICINE

## 2024-11-16 PROCEDURE — 85025 COMPLETE CBC W/AUTO DIFF WBC: CPT

## 2024-11-16 PROCEDURE — 6370000000 HC RX 637 (ALT 250 FOR IP): Performed by: INTERNAL MEDICINE

## 2024-11-16 PROCEDURE — 6370000000 HC RX 637 (ALT 250 FOR IP): Performed by: NURSE PRACTITIONER

## 2024-11-16 PROCEDURE — 36415 COLL VENOUS BLD VENIPUNCTURE: CPT

## 2024-11-16 PROCEDURE — 94640 AIRWAY INHALATION TREATMENT: CPT

## 2024-11-16 PROCEDURE — 80048 BASIC METABOLIC PNL TOTAL CA: CPT

## 2024-11-16 RX ORDER — ASPIRIN 81 MG/1
81 TABLET, CHEWABLE ORAL DAILY
Qty: 30 TABLET | Refills: 0 | Status: SHIPPED | OUTPATIENT
Start: 2024-11-17

## 2024-11-16 RX ORDER — SPIRONOLACTONE 25 MG/1
25 TABLET ORAL DAILY
Qty: 30 TABLET | Refills: 0 | Status: SHIPPED | OUTPATIENT
Start: 2024-11-17

## 2024-11-16 RX ORDER — CLOPIDOGREL BISULFATE 75 MG/1
75 TABLET ORAL DAILY
Qty: 30 TABLET | Refills: 0 | Status: SHIPPED | OUTPATIENT
Start: 2024-11-17

## 2024-11-16 RX ORDER — LOSARTAN POTASSIUM 25 MG/1
25 TABLET ORAL DAILY
Qty: 30 TABLET | Refills: 0 | Status: SHIPPED | OUTPATIENT
Start: 2024-11-17

## 2024-11-16 RX ORDER — POTASSIUM CHLORIDE 1500 MG/1
40 TABLET, EXTENDED RELEASE ORAL ONCE
Status: COMPLETED | OUTPATIENT
Start: 2024-11-16 | End: 2024-11-16

## 2024-11-16 RX ORDER — PANTOPRAZOLE SODIUM 40 MG/1
40 TABLET, DELAYED RELEASE ORAL
Qty: 30 TABLET | Refills: 0 | Status: SHIPPED | OUTPATIENT
Start: 2024-11-17

## 2024-11-16 RX ORDER — METOPROLOL SUCCINATE 25 MG/1
25 TABLET, EXTENDED RELEASE ORAL DAILY
Qty: 30 TABLET | Refills: 3 | Status: SHIPPED | OUTPATIENT
Start: 2024-11-17

## 2024-11-16 RX ADMIN — SPIRONOLACTONE 25 MG: 25 TABLET ORAL at 09:38

## 2024-11-16 RX ADMIN — PANTOPRAZOLE SODIUM 40 MG: 40 TABLET, DELAYED RELEASE ORAL at 05:14

## 2024-11-16 RX ADMIN — SODIUM CHLORIDE, PRESERVATIVE FREE 10 ML: 5 INJECTION INTRAVENOUS at 09:38

## 2024-11-16 RX ADMIN — METOPROLOL SUCCINATE 25 MG: 25 TABLET, EXTENDED RELEASE ORAL at 10:13

## 2024-11-16 RX ADMIN — POTASSIUM CHLORIDE 40 MEQ: 1500 TABLET, EXTENDED RELEASE ORAL at 10:56

## 2024-11-16 RX ADMIN — IPRATROPIUM BROMIDE AND ALBUTEROL SULFATE 1 DOSE: 2.5; .5 SOLUTION RESPIRATORY (INHALATION) at 06:17

## 2024-11-16 RX ADMIN — ASPIRIN 81 MG: 81 TABLET, CHEWABLE ORAL at 09:38

## 2024-11-16 RX ADMIN — LOSARTAN POTASSIUM 25 MG: 50 TABLET, FILM COATED ORAL at 09:38

## 2024-11-16 RX ADMIN — ATORVASTATIN CALCIUM 40 MG: 40 TABLET, FILM COATED ORAL at 09:38

## 2024-11-16 RX ADMIN — CLOPIDOGREL BISULFATE 75 MG: 75 TABLET ORAL at 09:38

## 2024-11-16 NOTE — DISCHARGE SUMMARY
Internal Medicine Progress Note     [facility]     Galindo Siddiqui D.O., LETICIAOChristopherI.                         Gaston Vargas D.O., ROB.[email or web address].  Drew Reinoso D.O.     Leonor Proctor, MSN, APRN, NP-C  Benigno Curtis, MSN, APRN-CNP  Ulises Berman, MSN, APRN, NP-C  Sarah Alcazar, MSN, APRN-CNP  Susan Nicole, MSN, APRN, NP-C       Internal Medicine  Discharge Summary    NAME: Clifton Nguyễn  :  1960  MRN:  [id number]  PCP:Pankaj Martin DO  ADMITTED: 2024      DISCHARGED: 24    ADMITTING PHYSICIAN: Galindo Siddiqui DO    CONSULTANT(S):   IP CONSULT TO CARDIOLOGY  IP CONSULT TO CARDIAC REHAB     ADMITTING DIAGNOSIS:   Shortness of breath [R06.02]  Acute coronary syndrome (HCC) [I24.9]  ACS (acute coronary syndrome) (HCC) [I24.9]  Acute chest pain [R07.9]  NSTEMI (non-ST elevated myocardial infarction) (HCC) [I21.4]  Elevated d-dimer [R79.89]     DISCHARGE DIAGNOSES:   Acute coronary syndrome with non-ST-elevation myocardial infarction status post cardiac catheterization with PCI and stent placement x 2 November 15, 2024 by .  Newly identified ischemic cardiomyopathy with LVEF depressed at 40%   valvular heart disease with mitral and tricuspid regurgitation.  Hyperlipidemia.  Ongoing nicotine use.  Chronic low back pain.    History of erectile dysfunction, on Levitra.    BRIEF HISTORY OF PRESENT ILLNESS:   This is a pleasant 63-year-old white male who is admitted to [facility]. The patient presented to the hospital here through the emergency room early morning with chief complaint of significant amount of chest pain. This pleasant 63-year-old white male presented to the hospital here at which time he was complaining of chest pain with shortness of breath. This started approximately an hour and a half. This has been present for approximately one and a half day prior to admission. The patient over the past week, but more over the past several  mildly dilated.   Right Atrium: Right atrium is mildly dilated.   Pulmonary Arteries: Mild pulmonary hypertension present.   IVC/SVC: IVC is mildly dilated.   Image quality is adequate.     CTA PULMONARY W CONTRAST    Result Date: 11/14/2024  EXAMINATION: CTA OF THE CHEST 11/14/2024 3:49 am TECHNIQUE: CTA of the chest was performed after the administration of intravenous contrast.  Multiplanar reformatted images are provided for review.  MIP images are provided for review. Automated exposure control, iterative reconstruction, and/or weight based adjustment of the mA/kV was utilized to reduce the radiation dose to as low as reasonably achievable. COMPARISON: None. HISTORY: ORDERING SYSTEM PROVIDED HISTORY: CP, SOB, eleavted d-dimer, concern for PE TECHNOLOGIST PROVIDED HISTORY: Reason for exam:->CP, SOB, eleavted d-dimer, concern for PE Additional Contrast?->1 FINDINGS: Pulmonary Arteries: Pulmonary arteries are adequately opacified for evaluation.  No evidence of intraluminal filling defect to suggest pulmonary embolism.  Main pulmonary artery is normal in caliber. Mediastinum: No evidence of mediastinal lymphadenopathy.  The heart and pericardium demonstrate no acute abnormality.  There is no acute abnormality of the thoracic aorta. Lungs/pleura: The lungs are without acute process.  No focal consolidation or pulmonary edema.  Small right and trace left pleural effusions.  No pneumothorax. Upper Abdomen: Limited images of the upper abdomen are unremarkable. Soft Tissues/Bones: No acute bone or soft tissue abnormality.     1. No evidence of pulmonary embolism. 2. Small right and trace left pleural effusions.     XR CHEST PORTABLE    Result Date: 11/14/2024  EXAMINATION: ONE XRAY VIEW OF THE CHEST 11/14/2024 2:59 am COMPARISON: None. HISTORY: ORDERING SYSTEM PROVIDED HISTORY: chest pain, sob TECHNOLOGIST PROVIDED HISTORY: Reason for exam:->chest pain, sob FINDINGS: Normal cardiomediastinal silhouette. Lungs clear.

## 2024-11-16 NOTE — PLAN OF CARE
Problem: Discharge Planning  Goal: Discharge to home or other facility with appropriate resources  11/16/2024 1109 by Shyann Posadas RN  Outcome: Progressing  11/16/2024 0000 by Jesika Lane, RN  Outcome: Progressing  Flowsheets (Taken 11/15/2024 1545 by Elena Reyes, RN)  Discharge to home or other facility with appropriate resources: Identify barriers to discharge with patient and caregiver     Problem: Safety - Adult  Goal: Free from fall injury  11/16/2024 1109 by Shyann Posadas RN  Outcome: Progressing  Flowsheets (Taken 11/16/2024 0800)  Free From Fall Injury: Based on caregiver fall risk screen, instruct family/caregiver to ask for assistance with transferring infant if caregiver noted to have fall risk factors  11/16/2024 0000 by Jesika Lane, RN  Outcome: Progressing     Problem: ABCDS Injury Assessment  Goal: Absence of physical injury  11/16/2024 1109 by Shyann Posadas RN  Outcome: Progressing  11/16/2024 0000 by Jesika Lane, RN  Outcome: Progressing

## 2024-11-16 NOTE — PROGRESS NOTES
Cardiology  Progress Note      SUBJECTIVE:  No chest pain. No dyspnea.  He wants to go home.    Current Inpatient Medications  Current Facility-Administered Medications: ipratropium 0.5 mg-albuterol 2.5 mg (DUONEB) nebulizer solution 1 Dose, 1 Dose, Inhalation, 4x Daily RT  sodium chloride flush 0.9 % injection 5-40 mL, 5-40 mL, IntraVENous, 2 times per day  sodium chloride flush 0.9 % injection 5-40 mL, 5-40 mL, IntraVENous, PRN  0.9 % sodium chloride infusion, , IntraVENous, PRN  acetaminophen (TYLENOL) tablet 650 mg, 650 mg, Oral, Q4H PRN  losartan (COZAAR) tablet 25 mg, 25 mg, Oral, Daily  spironolactone (ALDACTONE) tablet 25 mg, 25 mg, Oral, Daily  clopidogrel (PLAVIX) tablet 75 mg, 75 mg, Oral, Daily  pantoprazole (PROTONIX) tablet 40 mg, 40 mg, Oral, QAM AC  perflutren lipid microspheres (DEFINITY) injection 1.5 mL, 1.5 mL, IntraVENous, ONCE PRN  atorvastatin (LIPITOR) tablet 40 mg, 40 mg, Oral, Daily  metoprolol succinate (TOPROL XL) extended release tablet 25 mg, 25 mg, Oral, Daily  [Held by provider] nitroglycerin (NITRO-BID) 2 % ointment 1 inch, 1 inch, Topical, 4 times per day  LORazepam (ATIVAN) tablet 0.5 mg, 0.5 mg, Oral, Q4H PRN  aspirin chewable tablet 81 mg, 81 mg, Oral, Daily      Physical  VITALS:  /60   Pulse 50   Temp 98.1 °F (36.7 °C) (Oral)   Resp 20   Ht 1.753 m (5' 9\")   Wt 87.6 kg (193 lb 3 oz)   SpO2 92%   BMI 28.53 kg/m²   CURRENT TEMPERATURE:  Temp: 98.1 °F (36.7 °C)  CONSTITUTIONAL: No acute distress.  EYES: Vision is intact.  ENT: No sore throat.  No ear drainage.  NECK: No JVD.  BACK: Symmetric.  LUNGS:  diminished breath sounds right base and left base  CARDIOVASCULAR:  normal S1 and S2, no S3, and no S4  ABDOMEN:  non-tender  NEUROLOGIC: No focal deficits.    EXTREMITIES: No edema cyanosis or clubbing.    DATA:      ECG:  I have reviewed EKG with the following interpretation:  normal sinus rhythm      Cardiology Labs:    Lab Results   Component Value Date/Time    NA

## 2024-11-16 NOTE — PLAN OF CARE
Problem: Discharge Planning  Goal: Discharge to home or other facility with appropriate resources  11/16/2024 1113 by Shyann Posadas RN  Outcome: Adequate for Discharge  11/16/2024 1109 by Shyann Posadas RN  Outcome: Progressing  11/16/2024 0000 by Jesika Lane, RN  Outcome: Progressing  Flowsheets (Taken 11/15/2024 1545 by Elena Reyes, RN)  Discharge to home or other facility with appropriate resources: Identify barriers to discharge with patient and caregiver     Problem: Safety - Adult  Goal: Free from fall injury  11/16/2024 1113 by Shyann Posadas RN  Outcome: Adequate for Discharge  11/16/2024 1109 by Shyann Posadas RN  Outcome: Progressing  Flowsheets (Taken 11/16/2024 0800)  Free From Fall Injury: Based on caregiver fall risk screen, instruct family/caregiver to ask for assistance with transferring infant if caregiver noted to have fall risk factors  11/16/2024 0000 by Jesika Lane, RN  Outcome: Progressing     Problem: ABCDS Injury Assessment  Goal: Absence of physical injury  11/16/2024 1113 by Shyann Posadas RN  Outcome: Adequate for Discharge  11/16/2024 1109 by Shyann Posadas RN  Outcome: Progressing  11/16/2024 0000 by Jesika Lane, RN  Outcome: Progressing

## 2024-11-16 NOTE — PLAN OF CARE
Problem: Discharge Planning  Goal: Discharge to home or other facility with appropriate resources  Outcome: Progressing  Flowsheets (Taken 11/15/2024 5165 by Elena Reyes, RN)  Discharge to home or other facility with appropriate resources: Identify barriers to discharge with patient and caregiver     Problem: Safety - Adult  Goal: Free from fall injury  Outcome: Progressing     Problem: ABCDS Injury Assessment  Goal: Absence of physical injury  Outcome: Progressing

## 2024-11-21 LAB
EKG ATRIAL RATE: 68 BPM
EKG P AXIS: 60 DEGREES
EKG P-R INTERVAL: 136 MS
EKG Q-T INTERVAL: 484 MS
EKG QRS DURATION: 166 MS
EKG QTC CALCULATION (BAZETT): 514 MS
EKG R AXIS: 92 DEGREES
EKG T AXIS: -17 DEGREES
EKG VENTRICULAR RATE: 68 BPM

## 2025-02-04 ENCOUNTER — HOSPITAL ENCOUNTER (OUTPATIENT)
Dept: ULTRASOUND IMAGING | Age: 65
Discharge: HOME | End: 2025-02-04
Attending: FAMILY MEDICINE
Payer: COMMERCIAL

## 2025-02-04 DIAGNOSIS — R09.89 OTHER SPECIFIED SYMPTOMS AND SIGNS INVOLVING THE CIRCULATORY AND RESPIRATORY SYSTEMS: ICD-10-CM

## 2025-02-04 PROCEDURE — 93880 EXTRACRANIAL BILAT STUDY: CPT

## (undated) DEVICE — GUIDEWIRE WITH ICE™ HYDROPHILIC COATING: Brand: CHOICE™

## (undated) DEVICE — KIT HND CTRL AT-XL65

## (undated) DEVICE — BAND COMPR L24CM REG CLR PLAS HEMSTAT EXT HK AND LOOP RETEN

## (undated) DEVICE — DEVICE INFL 20ML 30ATM DGT FLD DISPNS SYR W ACCESSPLUS BLU

## (undated) DEVICE — CATH BLLN ANGIO 2.50X30MM SC EUPHORA RX

## (undated) DEVICE — VALVE HEMSTAS 9FR POLYCARB L BOR DBL Y ACCS +

## (undated) DEVICE — GUIDEWIRE VASC L260CM DIA0.035IN TIP L3MM STD EXCHG PTFE J

## (undated) DEVICE — HEARTRAIL III GUIDING CATHETER: Brand: HEARTRAIL

## (undated) DEVICE — KIT MFLD ISOLATN NACL CNTRST PRT TBNG SPIK W/ PRSS TRNSDUC

## (undated) DEVICE — GLOVE SURG SZ 7.5 L11.73IN FNGR THK9.8MIL STRW LTX POLYMER

## (undated) DEVICE — CANNULA NSL CANN NSL L25FT TBNG AD O2 SUP SFT UC

## (undated) DEVICE — Device

## (undated) DEVICE — CATH BLLN ANGIO 2X20MM SC EUPHORA RX

## (undated) DEVICE — TUBING PRSS MON L12IN PVC RIG NONEXPANDING M TO FEM CONN

## (undated) DEVICE — PAD, DEFIB, ADULT, RADIOTRAN, PHYSIO, LO: Brand: MEDLINE

## (undated) DEVICE — HI-TORQUE WHISPER ES GUIDE WIRE .014 STRAIGHTTIP 3.0 CM X 190 CM: Brand: HI-TORQUE WHISPER

## (undated) DEVICE — GLIDESHEATH SLENDER STAINLESS STEEL KIT: Brand: GLIDESHEATH SLENDER

## (undated) DEVICE — GUIDEWIRE WITH ICE™ HYDROPHILIC COATING: Brand: CHOICE™ PT

## (undated) DEVICE — CATHETER INTVENT 5FR L150CM 0.014IN LO PROF GREATER TIP DST

## (undated) DEVICE — TOOL INSRT 0022IN S STL GWIRE